# Patient Record
Sex: FEMALE | Race: WHITE | Employment: OTHER | URBAN - METROPOLITAN AREA
[De-identification: names, ages, dates, MRNs, and addresses within clinical notes are randomized per-mention and may not be internally consistent; named-entity substitution may affect disease eponyms.]

---

## 2017-01-23 ENCOUNTER — OFFICE VISIT (OUTPATIENT)
Dept: INTERNAL MEDICINE CLINIC | Age: 67
End: 2017-01-23

## 2017-01-23 VITALS
TEMPERATURE: 98.4 F | OXYGEN SATURATION: 97 % | RESPIRATION RATE: 16 BRPM | DIASTOLIC BLOOD PRESSURE: 64 MMHG | HEART RATE: 107 BPM | SYSTOLIC BLOOD PRESSURE: 112 MMHG

## 2017-01-23 DIAGNOSIS — J01.01 ACUTE RECURRENT MAXILLARY SINUSITIS: Primary | ICD-10-CM

## 2017-01-23 PROCEDURE — 1123F ACP DISCUSS/DSCN MKR DOCD: CPT | Performed by: INTERNAL MEDICINE

## 2017-01-23 PROCEDURE — 1090F PRES/ABSN URINE INCON ASSESS: CPT | Performed by: INTERNAL MEDICINE

## 2017-01-23 PROCEDURE — 3017F COLORECTAL CA SCREEN DOC REV: CPT | Performed by: INTERNAL MEDICINE

## 2017-01-23 PROCEDURE — G8427 DOCREV CUR MEDS BY ELIG CLIN: HCPCS | Performed by: INTERNAL MEDICINE

## 2017-01-23 PROCEDURE — G8419 CALC BMI OUT NRM PARAM NOF/U: HCPCS | Performed by: INTERNAL MEDICINE

## 2017-01-23 PROCEDURE — 99213 OFFICE O/P EST LOW 20 MIN: CPT | Performed by: INTERNAL MEDICINE

## 2017-01-23 PROCEDURE — G8484 FLU IMMUNIZE NO ADMIN: HCPCS | Performed by: INTERNAL MEDICINE

## 2017-01-23 PROCEDURE — 1036F TOBACCO NON-USER: CPT | Performed by: INTERNAL MEDICINE

## 2017-01-23 PROCEDURE — G8400 PT W/DXA NO RESULTS DOC: HCPCS | Performed by: INTERNAL MEDICINE

## 2017-01-23 PROCEDURE — 4040F PNEUMOC VAC/ADMIN/RCVD: CPT | Performed by: INTERNAL MEDICINE

## 2017-01-23 PROCEDURE — 3014F SCREEN MAMMO DOC REV: CPT | Performed by: INTERNAL MEDICINE

## 2017-01-23 RX ORDER — AMOXICILLIN 500 MG/1
500 CAPSULE ORAL 3 TIMES DAILY
Qty: 30 CAPSULE | Refills: 0 | Status: SHIPPED | OUTPATIENT
Start: 2017-01-23 | End: 2017-02-02

## 2017-02-06 DIAGNOSIS — M54.16 RADICULOPATHY, LUMBAR REGION: ICD-10-CM

## 2017-02-06 RX ORDER — TRAMADOL HYDROCHLORIDE 50 MG/1
50 TABLET ORAL EVERY 6 HOURS PRN
Qty: 60 TABLET | Refills: 0 | Status: SHIPPED | OUTPATIENT
Start: 2017-02-06 | End: 2017-04-17 | Stop reason: SDUPTHER

## 2017-03-06 RX ORDER — ATORVASTATIN CALCIUM 20 MG/1
TABLET, FILM COATED ORAL
Qty: 30 TABLET | Refills: 11 | Status: SHIPPED | OUTPATIENT
Start: 2017-03-06 | End: 2018-02-17 | Stop reason: SDUPTHER

## 2017-03-06 RX ORDER — POTASSIUM CHLORIDE 750 MG/1
TABLET, FILM COATED, EXTENDED RELEASE ORAL
Qty: 60 TABLET | Refills: 5 | Status: SHIPPED | OUTPATIENT
Start: 2017-03-06 | End: 2017-06-12 | Stop reason: SDUPTHER

## 2017-03-10 ENCOUNTER — OFFICE VISIT (OUTPATIENT)
Dept: INTERNAL MEDICINE CLINIC | Age: 67
End: 2017-03-10

## 2017-03-10 VITALS
BODY MASS INDEX: 45.54 KG/M2 | WEIGHT: 257 LBS | OXYGEN SATURATION: 99 % | DIASTOLIC BLOOD PRESSURE: 76 MMHG | RESPIRATION RATE: 18 BRPM | HEART RATE: 100 BPM | SYSTOLIC BLOOD PRESSURE: 118 MMHG | HEIGHT: 63 IN

## 2017-03-10 DIAGNOSIS — M54.16 RADICULOPATHY, LUMBAR REGION: ICD-10-CM

## 2017-03-10 DIAGNOSIS — C50.612 MALIGNANT NEOPLASM OF AXILLARY TAIL OF LEFT FEMALE BREAST (HCC): Primary | ICD-10-CM

## 2017-03-10 PROCEDURE — 3017F COLORECTAL CA SCREEN DOC REV: CPT | Performed by: INTERNAL MEDICINE

## 2017-03-10 PROCEDURE — G8417 CALC BMI ABV UP PARAM F/U: HCPCS | Performed by: INTERNAL MEDICINE

## 2017-03-10 PROCEDURE — G8400 PT W/DXA NO RESULTS DOC: HCPCS | Performed by: INTERNAL MEDICINE

## 2017-03-10 PROCEDURE — G8427 DOCREV CUR MEDS BY ELIG CLIN: HCPCS | Performed by: INTERNAL MEDICINE

## 2017-03-10 PROCEDURE — 3014F SCREEN MAMMO DOC REV: CPT | Performed by: INTERNAL MEDICINE

## 2017-03-10 PROCEDURE — 1036F TOBACCO NON-USER: CPT | Performed by: INTERNAL MEDICINE

## 2017-03-10 PROCEDURE — 1090F PRES/ABSN URINE INCON ASSESS: CPT | Performed by: INTERNAL MEDICINE

## 2017-03-10 PROCEDURE — 1123F ACP DISCUSS/DSCN MKR DOCD: CPT | Performed by: INTERNAL MEDICINE

## 2017-03-10 PROCEDURE — 99213 OFFICE O/P EST LOW 20 MIN: CPT | Performed by: INTERNAL MEDICINE

## 2017-03-10 PROCEDURE — G8484 FLU IMMUNIZE NO ADMIN: HCPCS | Performed by: INTERNAL MEDICINE

## 2017-03-10 PROCEDURE — 4040F PNEUMOC VAC/ADMIN/RCVD: CPT | Performed by: INTERNAL MEDICINE

## 2017-03-10 RX ORDER — RANITIDINE 150 MG/1
150 TABLET ORAL
COMMUNITY
End: 2017-09-12

## 2017-03-10 RX ORDER — LOSARTAN POTASSIUM 50 MG/1
50 TABLET ORAL DAILY
COMMUNITY
Start: 2017-02-28 | End: 2019-05-08 | Stop reason: SDUPTHER

## 2017-03-10 RX ORDER — FUROSEMIDE 20 MG/1
20 TABLET ORAL
COMMUNITY
Start: 2017-02-28 | End: 2019-09-25 | Stop reason: SDUPTHER

## 2017-03-22 RX ORDER — MOMETASONE FUROATE 50 UG/1
SPRAY, METERED NASAL
Qty: 1 INHALER | Refills: 11 | Status: SHIPPED | OUTPATIENT
Start: 2017-03-22 | End: 2020-01-07 | Stop reason: SDUPTHER

## 2017-04-12 RX ORDER — HYDROCHLOROTHIAZIDE 25 MG/1
25 TABLET ORAL DAILY
Qty: 30 TABLET | Refills: 3
Start: 2017-04-12 | End: 2017-08-02 | Stop reason: SDUPTHER

## 2017-04-17 DIAGNOSIS — M54.16 RADICULOPATHY, LUMBAR REGION: ICD-10-CM

## 2017-04-17 RX ORDER — TRAMADOL HYDROCHLORIDE 50 MG/1
50 TABLET ORAL EVERY 6 HOURS PRN
Qty: 60 TABLET | Refills: 0 | Status: SHIPPED | OUTPATIENT
Start: 2017-04-17 | End: 2017-06-12 | Stop reason: SDUPTHER

## 2017-06-12 ENCOUNTER — OFFICE VISIT (OUTPATIENT)
Dept: INTERNAL MEDICINE CLINIC | Age: 67
End: 2017-06-12

## 2017-06-12 VITALS
HEART RATE: 80 BPM | WEIGHT: 255 LBS | RESPIRATION RATE: 16 BRPM | BODY MASS INDEX: 45.17 KG/M2 | DIASTOLIC BLOOD PRESSURE: 80 MMHG | SYSTOLIC BLOOD PRESSURE: 120 MMHG

## 2017-06-12 DIAGNOSIS — C50.612 MALIGNANT NEOPLASM OF AXILLARY TAIL OF LEFT FEMALE BREAST (HCC): ICD-10-CM

## 2017-06-12 DIAGNOSIS — I10 ESSENTIAL HYPERTENSION: ICD-10-CM

## 2017-06-12 DIAGNOSIS — M54.16 RADICULOPATHY, LUMBAR REGION: ICD-10-CM

## 2017-06-12 DIAGNOSIS — E78.00 HYPERCHOLESTEROLEMIA: ICD-10-CM

## 2017-06-12 DIAGNOSIS — E78.00 HYPERCHOLESTEROLEMIA: Primary | ICD-10-CM

## 2017-06-12 LAB
A/G RATIO: 1.6 (ref 1.1–2.2)
ALBUMIN SERPL-MCNC: 4 G/DL (ref 3.4–5)
ALP BLD-CCNC: 92 U/L (ref 40–129)
ALT SERPL-CCNC: 40 U/L (ref 10–40)
ANION GAP SERPL CALCULATED.3IONS-SCNC: 14 MMOL/L (ref 3–16)
AST SERPL-CCNC: 40 U/L (ref 15–37)
BASOPHILS ABSOLUTE: 0.1 K/UL (ref 0–0.2)
BASOPHILS RELATIVE PERCENT: 1.3 %
BILIRUB SERPL-MCNC: 0.3 MG/DL (ref 0–1)
BUN BLDV-MCNC: 13 MG/DL (ref 7–20)
CALCIUM SERPL-MCNC: 9.1 MG/DL (ref 8.3–10.6)
CHLORIDE BLD-SCNC: 99 MMOL/L (ref 99–110)
CHOLESTEROL, TOTAL: 177 MG/DL (ref 0–199)
CO2: 28 MMOL/L (ref 21–32)
CREAT SERPL-MCNC: <0.5 MG/DL (ref 0.6–1.2)
EOSINOPHILS ABSOLUTE: 0.1 K/UL (ref 0–0.6)
EOSINOPHILS RELATIVE PERCENT: 3.2 %
GFR AFRICAN AMERICAN: >60
GFR NON-AFRICAN AMERICAN: >60
GLOBULIN: 2.5 G/DL
GLUCOSE BLD-MCNC: 113 MG/DL (ref 70–99)
HCT VFR BLD CALC: 33.7 % (ref 36–48)
HDLC SERPL-MCNC: 51 MG/DL (ref 40–60)
HEMOGLOBIN: 10.3 G/DL (ref 12–16)
LDL CHOLESTEROL CALCULATED: 99 MG/DL
LYMPHOCYTES ABSOLUTE: 0.9 K/UL (ref 1–5.1)
LYMPHOCYTES RELATIVE PERCENT: 21.8 %
MCH RBC QN AUTO: 25 PG (ref 26–34)
MCHC RBC AUTO-ENTMCNC: 30.4 G/DL (ref 31–36)
MCV RBC AUTO: 82.1 FL (ref 80–100)
MONOCYTES ABSOLUTE: 0.6 K/UL (ref 0–1.3)
MONOCYTES RELATIVE PERCENT: 13 %
NEUTROPHILS ABSOLUTE: 2.6 K/UL (ref 1.7–7.7)
NEUTROPHILS RELATIVE PERCENT: 60.7 %
PDW BLD-RTO: 24.1 % (ref 12.4–15.4)
PLATELET # BLD: 346 K/UL (ref 135–450)
PMV BLD AUTO: 7.9 FL (ref 5–10.5)
POTASSIUM SERPL-SCNC: 3.9 MMOL/L (ref 3.5–5.1)
RBC # BLD: 4.1 M/UL (ref 4–5.2)
SODIUM BLD-SCNC: 141 MMOL/L (ref 136–145)
TOTAL PROTEIN: 6.5 G/DL (ref 6.4–8.2)
TRIGL SERPL-MCNC: 135 MG/DL (ref 0–150)
VLDLC SERPL CALC-MCNC: 27 MG/DL
WBC # BLD: 4.3 K/UL (ref 4–11)

## 2017-06-12 PROCEDURE — 99214 OFFICE O/P EST MOD 30 MIN: CPT | Performed by: INTERNAL MEDICINE

## 2017-06-12 RX ORDER — POTASSIUM CHLORIDE 1500 MG/1
20 TABLET, FILM COATED, EXTENDED RELEASE ORAL 2 TIMES DAILY
Qty: 60 TABLET | Refills: 11
Start: 2017-06-12 | End: 2018-01-23 | Stop reason: SDUPTHER

## 2017-06-12 RX ORDER — TRAMADOL HYDROCHLORIDE 50 MG/1
50 TABLET ORAL EVERY 6 HOURS PRN
Qty: 60 TABLET | Refills: 0 | Status: SHIPPED | OUTPATIENT
Start: 2017-06-12 | End: 2017-08-21 | Stop reason: SDUPTHER

## 2017-07-31 ENCOUNTER — PATIENT MESSAGE (OUTPATIENT)
Dept: INTERNAL MEDICINE CLINIC | Age: 67
End: 2017-07-31

## 2017-08-01 DIAGNOSIS — M54.30 SCIATICA WITHOUT BACK PAIN, UNSPECIFIED LATERALITY: Primary | ICD-10-CM

## 2017-08-01 DIAGNOSIS — R53.1 LACK OF STRENGTH: ICD-10-CM

## 2017-08-02 RX ORDER — HYDROCHLOROTHIAZIDE 25 MG/1
25 TABLET ORAL DAILY
Qty: 30 TABLET | Refills: 11 | Status: SHIPPED | OUTPATIENT
Start: 2017-08-02 | End: 2018-07-15 | Stop reason: SDUPTHER

## 2017-08-04 DIAGNOSIS — M54.30 SCIATICA, UNSPECIFIED LATERALITY: Primary | ICD-10-CM

## 2017-08-21 DIAGNOSIS — M54.16 RADICULOPATHY, LUMBAR REGION: ICD-10-CM

## 2017-08-21 RX ORDER — CELECOXIB 100 MG/1
CAPSULE ORAL
Qty: 60 CAPSULE | Refills: 10 | Status: SHIPPED | OUTPATIENT
Start: 2017-08-21 | End: 2018-07-11 | Stop reason: SDUPTHER

## 2017-08-21 RX ORDER — TRAMADOL HYDROCHLORIDE 50 MG/1
50 TABLET ORAL EVERY 6 HOURS PRN
Qty: 60 TABLET | Refills: 0 | Status: SHIPPED | OUTPATIENT
Start: 2017-08-21 | End: 2017-10-18 | Stop reason: SDUPTHER

## 2017-09-12 ENCOUNTER — OFFICE VISIT (OUTPATIENT)
Dept: INTERNAL MEDICINE CLINIC | Age: 67
End: 2017-09-12

## 2017-09-12 VITALS
SYSTOLIC BLOOD PRESSURE: 130 MMHG | WEIGHT: 257 LBS | HEIGHT: 63 IN | BODY MASS INDEX: 45.54 KG/M2 | HEART RATE: 80 BPM | DIASTOLIC BLOOD PRESSURE: 70 MMHG | RESPIRATION RATE: 18 BRPM

## 2017-09-12 DIAGNOSIS — Z23 NEED FOR PNEUMOCOCCAL VACCINATION: ICD-10-CM

## 2017-09-12 DIAGNOSIS — M15.9 OSTEOARTHRITIS, GENERALIZED: ICD-10-CM

## 2017-09-12 DIAGNOSIS — I10 ESSENTIAL HYPERTENSION: Primary | ICD-10-CM

## 2017-09-12 PROCEDURE — 90732 PPSV23 VACC 2 YRS+ SUBQ/IM: CPT | Performed by: INTERNAL MEDICINE

## 2017-09-12 PROCEDURE — 90471 IMMUNIZATION ADMIN: CPT | Performed by: INTERNAL MEDICINE

## 2017-09-12 PROCEDURE — 90662 IIV NO PRSV INCREASED AG IM: CPT | Performed by: INTERNAL MEDICINE

## 2017-09-12 PROCEDURE — 90472 IMMUNIZATION ADMIN EACH ADD: CPT | Performed by: INTERNAL MEDICINE

## 2017-09-12 PROCEDURE — 99213 OFFICE O/P EST LOW 20 MIN: CPT | Performed by: INTERNAL MEDICINE

## 2017-09-12 RX ORDER — ANASTROZOLE 1 MG/1
1 TABLET ORAL DAILY
Qty: 30 TABLET | Refills: 3
Start: 2017-09-12 | End: 2018-09-26 | Stop reason: SDUPTHER

## 2017-09-13 DIAGNOSIS — M51.36 DEGENERATIVE DISC DISEASE, LUMBAR: ICD-10-CM

## 2017-09-13 RX ORDER — DULOXETIN HYDROCHLORIDE 60 MG/1
CAPSULE, DELAYED RELEASE ORAL
Qty: 30 CAPSULE | Refills: 11 | Status: SHIPPED | OUTPATIENT
Start: 2017-09-13 | End: 2018-09-07 | Stop reason: SDUPTHER

## 2017-10-18 ENCOUNTER — TELEPHONE (OUTPATIENT)
Dept: INTERNAL MEDICINE CLINIC | Age: 67
End: 2017-10-18

## 2017-10-18 DIAGNOSIS — M54.16 RADICULOPATHY, LUMBAR REGION: ICD-10-CM

## 2017-10-18 DIAGNOSIS — M51.36 DEGENERATIVE DISC DISEASE, LUMBAR: ICD-10-CM

## 2017-10-18 RX ORDER — TRAMADOL HYDROCHLORIDE 50 MG/1
50 TABLET ORAL EVERY 6 HOURS PRN
Qty: 60 TABLET | Refills: 0 | Status: SHIPPED | OUTPATIENT
Start: 2017-10-18 | End: 2017-12-26 | Stop reason: SDUPTHER

## 2017-12-04 ENCOUNTER — OFFICE VISIT (OUTPATIENT)
Dept: INTERNAL MEDICINE CLINIC | Age: 67
End: 2017-12-04

## 2017-12-04 VITALS
HEART RATE: 104 BPM | BODY MASS INDEX: 45.03 KG/M2 | OXYGEN SATURATION: 94 % | DIASTOLIC BLOOD PRESSURE: 76 MMHG | WEIGHT: 254.2 LBS | RESPIRATION RATE: 18 BRPM | SYSTOLIC BLOOD PRESSURE: 132 MMHG

## 2017-12-04 DIAGNOSIS — R06.2 WHEEZES: ICD-10-CM

## 2017-12-04 DIAGNOSIS — J40 BRONCHITIS: ICD-10-CM

## 2017-12-04 PROCEDURE — 99213 OFFICE O/P EST LOW 20 MIN: CPT | Performed by: INTERNAL MEDICINE

## 2017-12-04 RX ORDER — ALBUTEROL SULFATE 1.25 MG/3ML
1 SOLUTION RESPIRATORY (INHALATION) EVERY 6 HOURS PRN
Qty: 30 VIAL | Refills: 11 | Status: SHIPPED | OUTPATIENT
Start: 2017-12-04 | End: 2020-06-17 | Stop reason: ALTCHOICE

## 2017-12-04 RX ORDER — PREDNISONE 10 MG/1
TABLET ORAL
Qty: 20 TABLET | Refills: 0 | Status: SHIPPED | OUTPATIENT
Start: 2017-12-04 | End: 2017-12-12

## 2017-12-04 NOTE — PROGRESS NOTES
Patricia Lank  1950 12/04/17    SUBJECTIVE:    Pt complains of cough productive of clear/yellow mucous, wheezing, rhinorrhea and nasal congestion that have resolved. She denies F/C, ear pain, sinus pain. Symptoms have been present for 3 weeks. She has used nasonex with no benefit. OBJECTIVE:    /76   Pulse 104   Resp 18   Wt 254 lb 3.2 oz (115.3 kg)   LMP  (LMP Unknown)   SpO2 94%   Breastfeeding? No   BMI 45.03 kg/m²     Physical Exam   Constitutional: She appears well-developed. HENT:   Right Ear: External ear normal.   Left Ear: External ear normal.   Nose: Nose normal.   Mouth/Throat: No oropharyngeal exudate. Eyes: Conjunctivae are normal.   Cardiovascular: Normal rate, regular rhythm and normal heart sounds. Pulmonary/Chest: Effort normal. She has wheezes. She has rhonchi. She has no rales. Lymphadenopathy:     She has no cervical adenopathy. Neurological: She is alert. ASSESSMENT:    1. Bronchitis    2. Wheezes        PLAN:    Brandi Brian was seen today for cough and shortness of breath. Diagnoses and all orders for this visit:    Bronchitis - likely viral, no ishaan for abx; will Tx with prednisone and albuterol  -     albuterol (ACCUNEB) 1.25 MG/3ML nebulizer solution; Inhale 3 mLs into the lungs every 6 hours as needed for Wheezing  -     predniSONE (DELTASONE) 10 MG tablet; Take 4 tablets daily for 2 days, then 3 tablets daily for 2 days, then two tablets daily for 2 days, then one tablet daily for 2 days    Wheezes  -     albuterol (ACCUNEB) 1.25 MG/3ML nebulizer solution;  Inhale 3 mLs into the lungs every 6 hours as needed for Wheezing

## 2017-12-12 ENCOUNTER — OFFICE VISIT (OUTPATIENT)
Dept: INTERNAL MEDICINE CLINIC | Age: 67
End: 2017-12-12

## 2017-12-12 VITALS
SYSTOLIC BLOOD PRESSURE: 124 MMHG | HEART RATE: 84 BPM | WEIGHT: 253 LBS | RESPIRATION RATE: 16 BRPM | BODY MASS INDEX: 44.82 KG/M2 | DIASTOLIC BLOOD PRESSURE: 72 MMHG

## 2017-12-12 DIAGNOSIS — M15.9 GENERALIZED OSTEOARTHRITIS: ICD-10-CM

## 2017-12-12 DIAGNOSIS — I10 ESSENTIAL HYPERTENSION: ICD-10-CM

## 2017-12-12 DIAGNOSIS — E78.00 HYPERCHOLESTEROLEMIA: Primary | ICD-10-CM

## 2017-12-12 DIAGNOSIS — E55.9 VITAMIN D DEFICIENCY: ICD-10-CM

## 2017-12-12 DIAGNOSIS — M54.16 RADICULOPATHY, LUMBAR REGION: ICD-10-CM

## 2017-12-12 PROCEDURE — 99214 OFFICE O/P EST MOD 30 MIN: CPT | Performed by: INTERNAL MEDICINE

## 2017-12-26 DIAGNOSIS — M54.16 RADICULOPATHY, LUMBAR REGION: ICD-10-CM

## 2017-12-26 RX ORDER — TRAMADOL HYDROCHLORIDE 50 MG/1
50 TABLET ORAL EVERY 6 HOURS PRN
Qty: 60 TABLET | Refills: 0 | Status: SHIPPED | OUTPATIENT
Start: 2017-12-26 | End: 2018-02-27 | Stop reason: SDUPTHER

## 2018-01-05 ENCOUNTER — OFFICE VISIT (OUTPATIENT)
Dept: INTERNAL MEDICINE CLINIC | Age: 68
End: 2018-01-05

## 2018-01-05 VITALS
DIASTOLIC BLOOD PRESSURE: 76 MMHG | RESPIRATION RATE: 16 BRPM | HEART RATE: 84 BPM | SYSTOLIC BLOOD PRESSURE: 130 MMHG | TEMPERATURE: 98.3 F

## 2018-01-05 DIAGNOSIS — J01.00 ACUTE NON-RECURRENT MAXILLARY SINUSITIS: Primary | ICD-10-CM

## 2018-01-05 PROCEDURE — 99213 OFFICE O/P EST LOW 20 MIN: CPT | Performed by: INTERNAL MEDICINE

## 2018-01-05 RX ORDER — AMOXICILLIN 500 MG/1
500 CAPSULE ORAL 3 TIMES DAILY
Qty: 30 CAPSULE | Refills: 0 | Status: SHIPPED | OUTPATIENT
Start: 2018-01-05 | End: 2018-01-15

## 2018-01-05 RX ORDER — PREDNISONE 10 MG/1
TABLET ORAL
Qty: 20 TABLET | Refills: 0 | Status: SHIPPED | OUTPATIENT
Start: 2018-01-05 | End: 2018-01-15

## 2018-01-23 RX ORDER — POTASSIUM CHLORIDE 1500 MG/1
20 TABLET, FILM COATED, EXTENDED RELEASE ORAL 2 TIMES DAILY
Qty: 60 TABLET | Refills: 11 | Status: SHIPPED | OUTPATIENT
Start: 2018-01-23 | End: 2019-01-10 | Stop reason: SDUPTHER

## 2018-02-19 RX ORDER — ATORVASTATIN CALCIUM 20 MG/1
TABLET, FILM COATED ORAL
Qty: 30 TABLET | Refills: 11 | Status: SHIPPED | OUTPATIENT
Start: 2018-02-19 | End: 2018-03-21 | Stop reason: SDUPTHER

## 2018-02-27 DIAGNOSIS — M54.16 RADICULOPATHY, LUMBAR REGION: ICD-10-CM

## 2018-02-27 RX ORDER — TRAMADOL HYDROCHLORIDE 50 MG/1
50 TABLET ORAL EVERY 6 HOURS PRN
Qty: 60 TABLET | Refills: 0 | Status: SHIPPED | OUTPATIENT
Start: 2018-02-27 | End: 2018-04-30 | Stop reason: SDUPTHER

## 2018-03-14 ENCOUNTER — OFFICE VISIT (OUTPATIENT)
Dept: INTERNAL MEDICINE CLINIC | Age: 68
End: 2018-03-14

## 2018-03-14 VITALS
SYSTOLIC BLOOD PRESSURE: 118 MMHG | BODY MASS INDEX: 43.51 KG/M2 | HEART RATE: 92 BPM | OXYGEN SATURATION: 96 % | DIASTOLIC BLOOD PRESSURE: 70 MMHG | RESPIRATION RATE: 18 BRPM | WEIGHT: 245.6 LBS

## 2018-03-14 DIAGNOSIS — I10 ESSENTIAL HYPERTENSION: ICD-10-CM

## 2018-03-14 DIAGNOSIS — R73.01 IMPAIRED FASTING GLUCOSE: ICD-10-CM

## 2018-03-14 DIAGNOSIS — E78.00 HYPERCHOLESTEROLEMIA: ICD-10-CM

## 2018-03-14 DIAGNOSIS — C50.612 MALIGNANT NEOPLASM OF AXILLARY TAIL OF LEFT BREAST IN FEMALE, ESTROGEN RECEPTOR POSITIVE (HCC): ICD-10-CM

## 2018-03-14 DIAGNOSIS — E55.9 VITAMIN D DEFICIENCY: ICD-10-CM

## 2018-03-14 DIAGNOSIS — Z17.0 MALIGNANT NEOPLASM OF AXILLARY TAIL OF LEFT BREAST IN FEMALE, ESTROGEN RECEPTOR POSITIVE (HCC): ICD-10-CM

## 2018-03-14 DIAGNOSIS — Z78.0 MENOPAUSE: ICD-10-CM

## 2018-03-14 DIAGNOSIS — M54.12 CERVICAL RADICULOPATHY: Primary | ICD-10-CM

## 2018-03-14 LAB
A/G RATIO: 1.4 (ref 1.1–2.2)
ALBUMIN SERPL-MCNC: 4.1 G/DL (ref 3.4–5)
ALP BLD-CCNC: 100 U/L (ref 40–129)
ALT SERPL-CCNC: 20 U/L (ref 10–40)
ANION GAP SERPL CALCULATED.3IONS-SCNC: 16 MMOL/L (ref 3–16)
AST SERPL-CCNC: 23 U/L (ref 15–37)
BASOPHILS ABSOLUTE: 0.1 K/UL (ref 0–0.2)
BASOPHILS RELATIVE PERCENT: 1.4 %
BILIRUB SERPL-MCNC: 0.3 MG/DL (ref 0–1)
BUN BLDV-MCNC: 9 MG/DL (ref 7–20)
CALCIUM SERPL-MCNC: 9.1 MG/DL (ref 8.3–10.6)
CHLORIDE BLD-SCNC: 99 MMOL/L (ref 99–110)
CHOLESTEROL, TOTAL: 178 MG/DL (ref 0–199)
CO2: 29 MMOL/L (ref 21–32)
CREAT SERPL-MCNC: <0.5 MG/DL (ref 0.6–1.2)
EOSINOPHILS ABSOLUTE: 0.1 K/UL (ref 0–0.6)
EOSINOPHILS RELATIVE PERCENT: 2.6 %
GFR AFRICAN AMERICAN: >60
GFR NON-AFRICAN AMERICAN: >60
GLOBULIN: 3 G/DL
GLUCOSE BLD-MCNC: 104 MG/DL (ref 70–99)
HCT VFR BLD CALC: 38.1 % (ref 36–48)
HDLC SERPL-MCNC: 54 MG/DL (ref 40–60)
HEMOGLOBIN: 12.4 G/DL (ref 12–16)
LDL CHOLESTEROL CALCULATED: 87 MG/DL
LYMPHOCYTES ABSOLUTE: 1.2 K/UL (ref 1–5.1)
LYMPHOCYTES RELATIVE PERCENT: 24.3 %
MCH RBC QN AUTO: 28.2 PG (ref 26–34)
MCHC RBC AUTO-ENTMCNC: 32.6 G/DL (ref 31–36)
MCV RBC AUTO: 86.4 FL (ref 80–100)
MONOCYTES ABSOLUTE: 0.6 K/UL (ref 0–1.3)
MONOCYTES RELATIVE PERCENT: 11 %
NEUTROPHILS ABSOLUTE: 3.1 K/UL (ref 1.7–7.7)
NEUTROPHILS RELATIVE PERCENT: 60.7 %
PDW BLD-RTO: 19.9 % (ref 12.4–15.4)
PLATELET # BLD: 346 K/UL (ref 135–450)
PMV BLD AUTO: 9.1 FL (ref 5–10.5)
POTASSIUM SERPL-SCNC: 4.1 MMOL/L (ref 3.5–5.1)
RBC # BLD: 4.41 M/UL (ref 4–5.2)
SODIUM BLD-SCNC: 144 MMOL/L (ref 136–145)
TOTAL PROTEIN: 7.1 G/DL (ref 6.4–8.2)
TRIGL SERPL-MCNC: 187 MG/DL (ref 0–150)
VITAMIN D 25-HYDROXY: 40.1 NG/ML
VLDLC SERPL CALC-MCNC: 37 MG/DL
WBC # BLD: 5.1 K/UL (ref 4–11)

## 2018-03-14 PROCEDURE — 99214 OFFICE O/P EST MOD 30 MIN: CPT | Performed by: INTERNAL MEDICINE

## 2018-03-14 ASSESSMENT — PATIENT HEALTH QUESTIONNAIRE - PHQ9
SUM OF ALL RESPONSES TO PHQ9 QUESTIONS 1 & 2: 0
SUM OF ALL RESPONSES TO PHQ QUESTIONS 1-9: 0
2. FEELING DOWN, DEPRESSED OR HOPELESS: 0
1. LITTLE INTEREST OR PLEASURE IN DOING THINGS: 0

## 2018-03-14 NOTE — LETTER
[facility]  [address]  Phone: [phone]  Fax: [phone]    Awilda Fernando MD        March 14, 2018     Patient: Gaviota Palencia   YOB: 1950   Date of Visit: 3/14/2018       To Whom it May Concern:    Gaviota Palencia was seen in my clinic on 3/14/2018. She may return to work today. If you have any questions or concerns, please don't hesitate to call.     Sincerely,         Awilda Fernando MD

## 2018-03-15 LAB
ESTIMATED AVERAGE GLUCOSE: 151.3 MG/DL
HBA1C MFR BLD: 6.9 %

## 2018-03-21 ENCOUNTER — OFFICE VISIT (OUTPATIENT)
Dept: INTERNAL MEDICINE CLINIC | Age: 68
End: 2018-03-21

## 2018-03-21 VITALS
SYSTOLIC BLOOD PRESSURE: 122 MMHG | HEART RATE: 96 BPM | DIASTOLIC BLOOD PRESSURE: 76 MMHG | RESPIRATION RATE: 16 BRPM | OXYGEN SATURATION: 96 %

## 2018-03-21 DIAGNOSIS — E11.9 CONTROLLED TYPE 2 DIABETES MELLITUS WITHOUT COMPLICATION, WITHOUT LONG-TERM CURRENT USE OF INSULIN (HCC): Primary | ICD-10-CM

## 2018-03-21 PROCEDURE — 99213 OFFICE O/P EST LOW 20 MIN: CPT | Performed by: INTERNAL MEDICINE

## 2018-03-21 RX ORDER — ATORVASTATIN CALCIUM 40 MG/1
TABLET, FILM COATED ORAL
Qty: 30 TABLET | Refills: 11 | Status: SHIPPED | OUTPATIENT
Start: 2018-03-21 | End: 2019-03-05 | Stop reason: SDUPTHER

## 2018-03-21 RX ORDER — ASPIRIN 81 MG/1
81 TABLET, CHEWABLE ORAL DAILY
Qty: 30 TABLET | Refills: 3 | Status: ON HOLD | COMMUNITY
Start: 2018-03-21 | End: 2020-06-16 | Stop reason: HOSPADM

## 2018-03-27 ENCOUNTER — HOSPITAL ENCOUNTER (OUTPATIENT)
Dept: MRI IMAGING | Age: 68
Discharge: HOME | End: 2018-03-27
Attending: INTERNAL MEDICINE | Admitting: INTERNAL MEDICINE

## 2018-03-27 DIAGNOSIS — M54.12 CERVICAL RADICULOPATHY: ICD-10-CM

## 2018-03-27 DIAGNOSIS — M54.12 RADICULOPATHY OF CERVICAL REGION: ICD-10-CM

## 2018-03-28 DIAGNOSIS — M48.00 SPINAL STENOSIS, UNSPECIFIED SPINAL REGION: Primary | ICD-10-CM

## 2018-04-30 DIAGNOSIS — M54.16 RADICULOPATHY, LUMBAR REGION: ICD-10-CM

## 2018-04-30 RX ORDER — TRAMADOL HYDROCHLORIDE 50 MG/1
50 TABLET ORAL EVERY 6 HOURS PRN
Qty: 60 TABLET | Refills: 0 | Status: SHIPPED | OUTPATIENT
Start: 2018-04-30 | End: 2018-06-27 | Stop reason: SDUPTHER

## 2018-06-27 ENCOUNTER — OFFICE VISIT (OUTPATIENT)
Dept: INTERNAL MEDICINE CLINIC | Age: 68
End: 2018-06-27

## 2018-06-27 VITALS
DIASTOLIC BLOOD PRESSURE: 72 MMHG | BODY MASS INDEX: 45.35 KG/M2 | OXYGEN SATURATION: 94 % | SYSTOLIC BLOOD PRESSURE: 118 MMHG | WEIGHT: 256 LBS | HEART RATE: 101 BPM | RESPIRATION RATE: 18 BRPM

## 2018-06-27 DIAGNOSIS — E11.9 CONTROLLED TYPE 2 DIABETES MELLITUS WITHOUT COMPLICATION, WITHOUT LONG-TERM CURRENT USE OF INSULIN (HCC): ICD-10-CM

## 2018-06-27 DIAGNOSIS — M54.16 RADICULOPATHY, LUMBAR REGION: Primary | ICD-10-CM

## 2018-06-27 DIAGNOSIS — Z91.81 AT HIGH RISK FOR FALLS: ICD-10-CM

## 2018-06-27 PROCEDURE — 99213 OFFICE O/P EST LOW 20 MIN: CPT | Performed by: INTERNAL MEDICINE

## 2018-06-27 RX ORDER — GABAPENTIN 100 MG/1
100 CAPSULE ORAL NIGHTLY
Qty: 90 CAPSULE | Refills: 3 | Status: SHIPPED | OUTPATIENT
Start: 2018-06-27 | End: 2019-07-17 | Stop reason: SDUPTHER

## 2018-06-27 RX ORDER — TRAMADOL HYDROCHLORIDE 50 MG/1
50 TABLET ORAL EVERY 6 HOURS PRN
Qty: 60 TABLET | Refills: 0 | Status: SHIPPED | OUTPATIENT
Start: 2018-06-27 | End: 2018-09-05 | Stop reason: SDUPTHER

## 2018-07-11 RX ORDER — CELECOXIB 100 MG/1
CAPSULE ORAL
Qty: 60 CAPSULE | Refills: 11 | Status: ON HOLD | OUTPATIENT
Start: 2018-07-11 | End: 2018-10-22

## 2018-07-16 RX ORDER — HYDROCHLOROTHIAZIDE 25 MG/1
TABLET ORAL
Qty: 30 TABLET | Refills: 10 | Status: SHIPPED | OUTPATIENT
Start: 2018-07-16 | End: 2019-05-07 | Stop reason: SDUPTHER

## 2018-07-17 ENCOUNTER — HOSPITAL ENCOUNTER (OUTPATIENT)
Dept: DIABETES SERVICES | Age: 68
Discharge: HOME | End: 2018-07-31
Attending: INTERNAL MEDICINE | Admitting: INTERNAL MEDICINE

## 2018-07-17 RX ORDER — MULTIVIT WITH MINERALS/LUTEIN
1000 TABLET ORAL DAILY
COMMUNITY
End: 2020-06-17 | Stop reason: ALTCHOICE

## 2018-07-17 RX ORDER — MAGNESIUM 30 MG
50 TABLET ORAL 2 TIMES DAILY
COMMUNITY
End: 2020-06-17 | Stop reason: ALTCHOICE

## 2018-07-17 ASSESSMENT — PROBLEM AREAS IN DIABETES QUESTIONNAIRE (PAID)
WORRYING ABOUT THE FUTURE AND THE POSSIBILITY OF SERIOUS COMPLICATIONS: 2
FEELING THAT DIABETES IS TAKING UP TOO MUCH OF YOUR MENTAL AND PHYSICAL ENERGY EVERY DAY: 1
COPING WITH COMPLICATIONS OF DIABETES: 1
FEELING DEPRESSED WHEN YOU THINK ABOUT LIVING WITH DIABETES: 1

## 2018-08-01 ENCOUNTER — HOSPITAL ENCOUNTER (OUTPATIENT)
Dept: OTHER | Age: 68
Discharge: HOME | End: 2018-08-31
Attending: INTERNAL MEDICINE | Admitting: INTERNAL MEDICINE

## 2018-09-05 DIAGNOSIS — M54.16 RADICULOPATHY, LUMBAR REGION: ICD-10-CM

## 2018-09-05 RX ORDER — TRAMADOL HYDROCHLORIDE 50 MG/1
50 TABLET ORAL EVERY 6 HOURS PRN
Qty: 60 TABLET | Refills: 0 | Status: SHIPPED | OUTPATIENT
Start: 2018-09-05 | End: 2018-11-12 | Stop reason: SDUPTHER

## 2018-09-07 DIAGNOSIS — M51.36 DEGENERATIVE DISC DISEASE, LUMBAR: ICD-10-CM

## 2018-09-07 RX ORDER — DULOXETIN HYDROCHLORIDE 60 MG/1
CAPSULE, DELAYED RELEASE ORAL
Qty: 30 CAPSULE | Refills: 11 | Status: SHIPPED | OUTPATIENT
Start: 2018-09-07 | End: 2019-06-25

## 2018-09-26 ENCOUNTER — OFFICE VISIT (OUTPATIENT)
Dept: INTERNAL MEDICINE CLINIC | Age: 68
End: 2018-09-26
Payer: COMMERCIAL

## 2018-09-26 VITALS
HEART RATE: 84 BPM | WEIGHT: 257.4 LBS | SYSTOLIC BLOOD PRESSURE: 116 MMHG | OXYGEN SATURATION: 95 % | DIASTOLIC BLOOD PRESSURE: 74 MMHG | RESPIRATION RATE: 18 BRPM | BODY MASS INDEX: 45.6 KG/M2

## 2018-09-26 DIAGNOSIS — E78.00 HYPERCHOLESTEROLEMIA: ICD-10-CM

## 2018-09-26 DIAGNOSIS — I10 ESSENTIAL HYPERTENSION: ICD-10-CM

## 2018-09-26 DIAGNOSIS — M54.16 RADICULOPATHY, LUMBAR REGION: ICD-10-CM

## 2018-09-26 DIAGNOSIS — Z23 FLU VACCINE NEED: ICD-10-CM

## 2018-09-26 DIAGNOSIS — E11.9 CONTROLLED TYPE 2 DIABETES MELLITUS WITHOUT COMPLICATION, WITHOUT LONG-TERM CURRENT USE OF INSULIN (HCC): Primary | ICD-10-CM

## 2018-09-26 DIAGNOSIS — M15.9 GENERALIZED OSTEOARTHRITIS: ICD-10-CM

## 2018-09-26 DIAGNOSIS — E11.9 CONTROLLED TYPE 2 DIABETES MELLITUS WITHOUT COMPLICATION, WITHOUT LONG-TERM CURRENT USE OF INSULIN (HCC): ICD-10-CM

## 2018-09-26 LAB
A/G RATIO: 1.5 (ref 1.1–2.2)
ALBUMIN SERPL-MCNC: 4.1 G/DL (ref 3.4–5)
ALP BLD-CCNC: 101 U/L (ref 40–129)
ALT SERPL-CCNC: 14 U/L (ref 10–40)
ANION GAP SERPL CALCULATED.3IONS-SCNC: 14 MMOL/L (ref 3–16)
AST SERPL-CCNC: 19 U/L (ref 15–37)
BASOPHILS ABSOLUTE: 0.1 K/UL (ref 0–0.2)
BASOPHILS RELATIVE PERCENT: 1 %
BILIRUB SERPL-MCNC: 0.4 MG/DL (ref 0–1)
BUN BLDV-MCNC: 11 MG/DL (ref 7–20)
CALCIUM SERPL-MCNC: 9.5 MG/DL (ref 8.3–10.6)
CHLORIDE BLD-SCNC: 101 MMOL/L (ref 99–110)
CHOLESTEROL, TOTAL: 177 MG/DL (ref 0–199)
CO2: 26 MMOL/L (ref 21–32)
CREAT SERPL-MCNC: <0.5 MG/DL (ref 0.6–1.2)
CREATININE URINE: 52.5 MG/DL (ref 28–259)
EOSINOPHILS ABSOLUTE: 0.2 K/UL (ref 0–0.6)
EOSINOPHILS RELATIVE PERCENT: 4.8 %
GFR AFRICAN AMERICAN: >60
GFR NON-AFRICAN AMERICAN: >60
GLOBULIN: 2.7 G/DL
GLUCOSE BLD-MCNC: 110 MG/DL (ref 70–99)
HCT VFR BLD CALC: 31.5 % (ref 36–48)
HDLC SERPL-MCNC: 57 MG/DL (ref 40–60)
HEMOGLOBIN: 9.8 G/DL (ref 12–16)
LDL CHOLESTEROL CALCULATED: 94 MG/DL
LYMPHOCYTES ABSOLUTE: 1.2 K/UL (ref 1–5.1)
LYMPHOCYTES RELATIVE PERCENT: 23.6 %
MCH RBC QN AUTO: 24.4 PG (ref 26–34)
MCHC RBC AUTO-ENTMCNC: 31 G/DL (ref 31–36)
MCV RBC AUTO: 78.8 FL (ref 80–100)
MICROALBUMIN UR-MCNC: <1.2 MG/DL
MICROALBUMIN/CREAT UR-RTO: NORMAL MG/G (ref 0–30)
MONOCYTES ABSOLUTE: 0.5 K/UL (ref 0–1.3)
MONOCYTES RELATIVE PERCENT: 9.2 %
NEUTROPHILS ABSOLUTE: 3 K/UL (ref 1.7–7.7)
NEUTROPHILS RELATIVE PERCENT: 61.4 %
PDW BLD-RTO: 19.8 % (ref 12.4–15.4)
PLATELET # BLD: 424 K/UL (ref 135–450)
PMV BLD AUTO: 8.3 FL (ref 5–10.5)
POTASSIUM SERPL-SCNC: 4.4 MMOL/L (ref 3.5–5.1)
RBC # BLD: 3.99 M/UL (ref 4–5.2)
SODIUM BLD-SCNC: 141 MMOL/L (ref 136–145)
TOTAL PROTEIN: 6.8 G/DL (ref 6.4–8.2)
TRIGL SERPL-MCNC: 131 MG/DL (ref 0–150)
VLDLC SERPL CALC-MCNC: 26 MG/DL
WBC # BLD: 4.9 K/UL (ref 4–11)

## 2018-09-26 PROCEDURE — 90662 IIV NO PRSV INCREASED AG IM: CPT | Performed by: INTERNAL MEDICINE

## 2018-09-26 PROCEDURE — 99214 OFFICE O/P EST MOD 30 MIN: CPT | Performed by: INTERNAL MEDICINE

## 2018-09-26 PROCEDURE — 90471 IMMUNIZATION ADMIN: CPT | Performed by: INTERNAL MEDICINE

## 2018-09-26 RX ORDER — ANASTROZOLE 1 MG/1
1 TABLET ORAL DAILY
Qty: 90 TABLET | Refills: 3 | Status: SHIPPED | OUTPATIENT
Start: 2018-09-26 | End: 2019-09-25 | Stop reason: SDUPTHER

## 2018-09-26 NOTE — LETTER
IMPRESSION:  1. BMD is characterized as normal.   2. The current measurement overestimates BMD at the spine, likely due to  presence of degenerative changes. * Osteoporosis is defined as a skeletal disorder characterized by compromised  bone strength predisposing to an increased risk of fracture. Bone strength  reflects the integration of two main features: BMD and bone quality (DENNIS  2001;285:785-795). Any history of fragility fracture is suggestive of  osteoporosis, regardless of the BMD data acquired in this study. * The T-score reflects standard deviations above (+) or below (-) a [address]  year-old, , female, [address] [facility] [facility] [address] population. At this age, it is assumed that  peak bone mass is reached. * The WHO densitometric classification is applicable to postmenopausal women  and men age 48 and older: a T-score >/= -1.0 is normal, < -1.0 and > -2.5 is  osteopenia, and =/< -2.5 is osteoporosis. * The Z-score reflects standard deviations above (+) or below (-) an age, sex,  ethnicity, and weight-matched population. Electronically Signed By: Maurilio Quintero MD on 7/1/2018 10:20 AM   Result Narrative   DUAL ENERGY X-RAY ABSORPTIOMETRY (DEXA) REPORT    EXAM:  DEXA scanning was performed at the [address] [place] [address] using a [employer] Discovery A bone densitometer on 06/22/2018 09:37 AM.    CLINICAL INDICATIONS:  see comments, C50.812:Malignant neoplasm of overlapping sites of left breast  in female, estrogen receptor positive  Z17. 0:Malignant neoplasm of overlapping sites of left breast in female,  estrogen receptor positive  Z78.0:Postmenopausal  , Osteoporosis screening (L57.212)    FINDINGS:   1. Quality of the examination at the lumbar 1-4 spine is fair. Visible  morphological abnormalities are apparent.  Values at the spine are falsely  elevated likely due to presence of degenerative changes. Since L2 and L3  appear to contain the greatest progression, L1, L4 spine will be resulted.

## 2018-09-26 NOTE — PROGRESS NOTES
Whittier Records  1950 09/26/18    SUBJECTIVE:    Pt continues to struggle with back pain. She continues on cymbalta, ultram, celebrex, gabapentin. She has noticed that the brand name celebrex works better than the generic. Pt with DM - she is on no meds for this. The patient is taking hypertensive medications compliantly without side effects. Denies chest pain, dyspnea, edema, or TIA's. Patient denies any chest pain, shortness of breath, myalgias, Patient is tolerating cholesterol medications without difficulty. OBJECTIVE:    /74   Pulse 84   Resp 18   Wt 257 lb 6.4 oz (116.8 kg)   LMP  (LMP Unknown)   SpO2 95%   BMI 45.60 kg/m²     Physical Exam   Constitutional: She is oriented to person, place, and time. She appears well-developed and well-nourished. Eyes: Conjunctivae are normal. No scleral icterus. Neck: Neck supple. No tracheal deviation present. No thyromegaly present. Cardiovascular: Normal rate, regular rhythm and intact distal pulses. Exam reveals no gallop and no friction rub. No murmur heard. Pulses:       Dorsalis pedis pulses are 2+ on the right side, and 2+ on the left side. Posterior tibial pulses are 2+ on the right side, and 2+ on the left side. Pulmonary/Chest: No respiratory distress. She has no wheezes. She has no rales. Abdominal: Soft. Bowel sounds are normal. She exhibits no distension and no mass. There is no hepatomegaly. There is no tenderness. There is no rebound and no guarding. Lymphadenopathy:     She has no cervical adenopathy. Neurological: She is alert and oriented to person, place, and time. No sensory deficit (to monofilament). Skin: Skin is warm, dry and intact. No cyanosis. Nails show no clubbing. No foot ulcerations   Psychiatric: She has a normal mood and affect. Her behavior is normal. Judgment normal.       ASSESSMENT:    1.  Controlled type 2 diabetes mellitus without complication, without long-term current use of

## 2018-09-27 LAB
ESTIMATED AVERAGE GLUCOSE: 151.3 MG/DL
HBA1C MFR BLD: 6.9 %

## 2018-09-28 DIAGNOSIS — D64.9 ANEMIA, UNSPECIFIED TYPE: Primary | ICD-10-CM

## 2018-09-28 LAB
FERRITIN: 14.8 NG/ML (ref 15–150)
IRON SATURATION: 9 % (ref 15–50)
IRON: 39 UG/DL (ref 37–145)
TOTAL IRON BINDING CAPACITY: 454 UG/DL (ref 260–445)

## 2018-10-01 DIAGNOSIS — D50.8 OTHER IRON DEFICIENCY ANEMIA: Primary | ICD-10-CM

## 2018-10-18 NOTE — PROGRESS NOTES
1. Hx of anesthesia complications? --no  2. Hx of difficult airway/intubation? --no  3. Hx of changed chest pain/CHF exacerbation in last 6 mo. ? --no  4. Home O2 dependent? --no  5. Able to climb one flight of stairs w/o SOB? --yes  6.  Recent COPD exacerbation or pneumonia/bronchitis that has been treated in last      month? --no

## 2018-10-22 ENCOUNTER — ANESTHESIA (OUTPATIENT)
Dept: OPERATING ROOM | Age: 68
End: 2018-10-22
Payer: COMMERCIAL

## 2018-10-22 ENCOUNTER — HOSPITAL ENCOUNTER (OUTPATIENT)
Age: 68
Setting detail: OUTPATIENT SURGERY
Discharge: HOME | End: 2018-10-22
Attending: SPECIALIST | Admitting: SPECIALIST
Payer: COMMERCIAL

## 2018-10-22 ENCOUNTER — ANESTHESIA EVENT (OUTPATIENT)
Dept: OPERATING ROOM | Age: 68
End: 2018-10-22
Payer: COMMERCIAL

## 2018-10-22 VITALS
RESPIRATION RATE: 16 BRPM | SYSTOLIC BLOOD PRESSURE: 116 MMHG | WEIGHT: 252 LBS | HEART RATE: 79 BPM | BODY MASS INDEX: 46.38 KG/M2 | DIASTOLIC BLOOD PRESSURE: 68 MMHG | TEMPERATURE: 98.5 F | OXYGEN SATURATION: 96 % | HEIGHT: 62 IN

## 2018-10-22 VITALS
DIASTOLIC BLOOD PRESSURE: 105 MMHG | OXYGEN SATURATION: 100 % | SYSTOLIC BLOOD PRESSURE: 123 MMHG | RESPIRATION RATE: 16 BRPM

## 2018-10-22 PROCEDURE — 7100000011 HC PHASE II RECOVERY - ADDTL 15 MIN: Performed by: SPECIALIST

## 2018-10-22 PROCEDURE — 2580000003 HC RX 258: Performed by: SPECIALIST

## 2018-10-22 PROCEDURE — 3700000001 HC ADD 15 MINUTES (ANESTHESIA): Performed by: SPECIALIST

## 2018-10-22 PROCEDURE — 7100000010 HC PHASE II RECOVERY - FIRST 15 MIN: Performed by: SPECIALIST

## 2018-10-22 PROCEDURE — 2500000003 HC RX 250 WO HCPCS: Performed by: NURSE ANESTHETIST, CERTIFIED REGISTERED

## 2018-10-22 PROCEDURE — 3609009500 HC COLONOSCOPY DIAGNOSTIC OR SCREENING: Performed by: SPECIALIST

## 2018-10-22 PROCEDURE — 2709999900 HC NON-CHARGEABLE SUPPLY: Performed by: SPECIALIST

## 2018-10-22 PROCEDURE — 6360000002 HC RX W HCPCS: Performed by: NURSE ANESTHETIST, CERTIFIED REGISTERED

## 2018-10-22 PROCEDURE — 3700000000 HC ANESTHESIA ATTENDED CARE: Performed by: SPECIALIST

## 2018-10-22 PROCEDURE — 3609012400 HC EGD TRANSORAL BIOPSY SINGLE/MULTIPLE: Performed by: SPECIALIST

## 2018-10-22 RX ORDER — SODIUM CHLORIDE, SODIUM LACTATE, POTASSIUM CHLORIDE, CALCIUM CHLORIDE 600; 310; 30; 20 MG/100ML; MG/100ML; MG/100ML; MG/100ML
INJECTION, SOLUTION INTRAVENOUS CONTINUOUS
Status: DISCONTINUED | OUTPATIENT
Start: 2018-10-22 | End: 2018-10-22 | Stop reason: HOSPADM

## 2018-10-22 RX ORDER — OMEPRAZOLE 20 MG/1
20 CAPSULE, DELAYED RELEASE ORAL DAILY
Qty: 30 CAPSULE | Refills: 3 | Status: SHIPPED | OUTPATIENT
Start: 2018-10-22 | End: 2018-10-22

## 2018-10-22 RX ORDER — PROPOFOL 10 MG/ML
INJECTION, EMULSION INTRAVENOUS PRN
Status: DISCONTINUED | OUTPATIENT
Start: 2018-10-22 | End: 2018-10-22 | Stop reason: SDUPTHER

## 2018-10-22 RX ORDER — LIDOCAINE HYDROCHLORIDE 20 MG/ML
INJECTION, SOLUTION INFILTRATION; PERINEURAL PRN
Status: DISCONTINUED | OUTPATIENT
Start: 2018-10-22 | End: 2018-10-22 | Stop reason: SDUPTHER

## 2018-10-22 RX ORDER — OMEPRAZOLE 20 MG/1
20 CAPSULE, DELAYED RELEASE ORAL DAILY
Qty: 30 CAPSULE | Refills: 5 | Status: SHIPPED | OUTPATIENT
Start: 2018-10-22 | End: 2019-08-09 | Stop reason: SDUPTHER

## 2018-10-22 RX ORDER — FENTANYL CITRATE 50 UG/ML
INJECTION, SOLUTION INTRAMUSCULAR; INTRAVENOUS PRN
Status: DISCONTINUED | OUTPATIENT
Start: 2018-10-22 | End: 2018-10-22 | Stop reason: SDUPTHER

## 2018-10-22 RX ADMIN — SODIUM CHLORIDE, POTASSIUM CHLORIDE, SODIUM LACTATE AND CALCIUM CHLORIDE: 600; 310; 30; 20 INJECTION, SOLUTION INTRAVENOUS at 12:43

## 2018-10-22 RX ADMIN — PHENYLEPHRINE HYDROCHLORIDE 100 MCG: 10 INJECTION INTRAVENOUS at 13:58

## 2018-10-22 RX ADMIN — PROPOFOL 20 MG: 10 INJECTION, EMULSION INTRAVENOUS at 13:56

## 2018-10-22 RX ADMIN — PROPOFOL 10 MG: 10 INJECTION, EMULSION INTRAVENOUS at 14:01

## 2018-10-22 RX ADMIN — PHENYLEPHRINE HYDROCHLORIDE 200 MCG: 10 INJECTION INTRAVENOUS at 14:01

## 2018-10-22 RX ADMIN — PROPOFOL 100 MG: 10 INJECTION, EMULSION INTRAVENOUS at 13:48

## 2018-10-22 RX ADMIN — PROPOFOL 10 MG: 10 INJECTION, EMULSION INTRAVENOUS at 14:09

## 2018-10-22 RX ADMIN — PROPOFOL 20 MG: 10 INJECTION, EMULSION INTRAVENOUS at 13:57

## 2018-10-22 RX ADMIN — PROPOFOL 20 MG: 10 INJECTION, EMULSION INTRAVENOUS at 13:52

## 2018-10-22 RX ADMIN — PROPOFOL 80 MG: 10 INJECTION, EMULSION INTRAVENOUS at 14:07

## 2018-10-22 RX ADMIN — PROPOFOL 20 MG: 10 INJECTION, EMULSION INTRAVENOUS at 14:02

## 2018-10-22 RX ADMIN — PROPOFOL 20 MG: 10 INJECTION, EMULSION INTRAVENOUS at 14:08

## 2018-10-22 RX ADMIN — FENTANYL CITRATE 50 MCG: 50 INJECTION INTRAMUSCULAR; INTRAVENOUS at 13:55

## 2018-10-22 RX ADMIN — PROPOFOL 20 MG: 10 INJECTION, EMULSION INTRAVENOUS at 13:49

## 2018-10-22 RX ADMIN — PHENYLEPHRINE HYDROCHLORIDE 200 MCG: 10 INJECTION INTRAVENOUS at 14:04

## 2018-10-22 RX ADMIN — LIDOCAINE HYDROCHLORIDE 50 MG: 20 INJECTION, SOLUTION INFILTRATION; PERINEURAL at 13:48

## 2018-10-22 RX ADMIN — PROPOFOL 20 MG: 10 INJECTION, EMULSION INTRAVENOUS at 13:50

## 2018-10-22 RX ADMIN — PHENYLEPHRINE HYDROCHLORIDE 100 MCG: 10 INJECTION INTRAVENOUS at 13:55

## 2018-10-22 RX ADMIN — PHENYLEPHRINE HYDROCHLORIDE 200 MCG: 10 INJECTION INTRAVENOUS at 14:07

## 2018-10-22 RX ADMIN — LIDOCAINE HYDROCHLORIDE 50 MG: 20 INJECTION, SOLUTION INFILTRATION; PERINEURAL at 14:07

## 2018-10-22 RX ADMIN — PROPOFOL 40 MG: 10 INJECTION, EMULSION INTRAVENOUS at 13:51

## 2018-10-22 RX ADMIN — PROPOFOL 20 MG: 10 INJECTION, EMULSION INTRAVENOUS at 13:54

## 2018-10-22 ASSESSMENT — PAIN SCALES - GENERAL
PAINLEVEL_OUTOF10: 0
PAINLEVEL_OUTOF10: 0

## 2018-10-22 ASSESSMENT — PAIN - FUNCTIONAL ASSESSMENT: PAIN_FUNCTIONAL_ASSESSMENT: 0-10

## 2018-10-22 NOTE — BRIEF OP NOTE
BRIEF COLONOSCOPY REPORT:    Impression:    1) small internal hemorrhoids    2) otherwise normal        BRIEF EGD REPORT:    Impression:    1) 8 mm, clean-based ulcer superior aspect of duodenal bulb    2) large hiatal hernia   3) otherwise normal          SUGGEST:   1) add omeprazole as long as needs NSAID's       The complete operative report (including photos) is available in the following locations:   1) soft chart now   2) report will also be scanned and can then be found by going to \"chart review\" then \"notes\" then \"op report\" or by going to \"chart review\" then \"media\" then \"op report\". For review of photos, may need to go to page 2.

## 2018-11-12 DIAGNOSIS — M54.16 RADICULOPATHY, LUMBAR REGION: ICD-10-CM

## 2018-11-12 RX ORDER — TRAMADOL HYDROCHLORIDE 50 MG/1
50 TABLET ORAL EVERY 6 HOURS PRN
Qty: 60 TABLET | Refills: 0 | Status: SHIPPED | OUTPATIENT
Start: 2018-11-12 | End: 2018-12-27 | Stop reason: SDUPTHER

## 2018-11-26 ENCOUNTER — OFFICE VISIT (OUTPATIENT)
Dept: INTERNAL MEDICINE CLINIC | Age: 68
End: 2018-11-26
Payer: COMMERCIAL

## 2018-11-26 VITALS
OXYGEN SATURATION: 96 % | SYSTOLIC BLOOD PRESSURE: 116 MMHG | RESPIRATION RATE: 16 BRPM | DIASTOLIC BLOOD PRESSURE: 72 MMHG | HEART RATE: 96 BPM

## 2018-11-26 DIAGNOSIS — J40 BRONCHITIS: Primary | ICD-10-CM

## 2018-11-26 PROCEDURE — 99213 OFFICE O/P EST LOW 20 MIN: CPT | Performed by: INTERNAL MEDICINE

## 2018-11-26 RX ORDER — BENZONATATE 100 MG/1
100 CAPSULE ORAL 3 TIMES DAILY PRN
Qty: 30 CAPSULE | Refills: 0 | Status: SHIPPED | OUTPATIENT
Start: 2018-11-26 | End: 2018-12-03

## 2018-11-26 RX ORDER — PREDNISONE 10 MG/1
TABLET ORAL
Qty: 20 TABLET | Refills: 0 | Status: SHIPPED | OUTPATIENT
Start: 2018-11-26 | End: 2018-12-27 | Stop reason: ALTCHOICE

## 2018-12-02 ENCOUNTER — PATIENT MESSAGE (OUTPATIENT)
Dept: INTERNAL MEDICINE CLINIC | Age: 68
End: 2018-12-02

## 2018-12-03 RX ORDER — AZITHROMYCIN 250 MG/1
TABLET, FILM COATED ORAL
Qty: 1 PACKET | Refills: 0 | Status: SHIPPED | OUTPATIENT
Start: 2018-12-03 | End: 2018-12-27 | Stop reason: ALTCHOICE

## 2018-12-20 NOTE — ANESTHESIA PRE PROCEDURE
MD   mometasone (NASONEX) 50 MCG/ACT nasal spray INHALE 2 SPRAYS INTO EACH NOSRTIL ONCE DAILY 3/22/17   Valentin Monge MD   furosemide (LASIX) 20 MG tablet Take 20 mg by mouth 2/28/17   Historical Provider, MD   losartan (COZAAR) 50 MG tablet Take 50 mg by mouth daily  2/28/17   Historical Provider, MD   Cholecalciferol (VITAMIN D3) 1000 UNITS TABS Take 2 tablets by mouth daily 5/24/16   Valentin Monge MD   albuterol (PROVENTIL HFA;VENTOLIN HFA) 108 (90 BASE) MCG/ACT inhaler Inhale 2 puffs into the lungs every 6 hours as needed. 10/20/14   Valentin Monge MD   potassium chloride SA (K-DUR;KLOR-CON) 10 MEQ tablet Take 1 tablet by mouth daily. 2/4/13 2/23/14  Valentin Monge MD       Current medications:    No current facility-administered medications for this encounter. Current Outpatient Prescriptions   Medication Sig Dispense Refill    anastrozole (ARIMIDEX) 1 MG tablet Take 1 tablet by mouth daily 90 tablet 3    CELEBREX 100 MG capsule Take 1 capsule by mouth 2 times daily 60 capsule 11    DULoxetine (CYMBALTA) 60 MG extended release capsule TAKE 1 CAPSULE BY MOUTH DAILY 30 capsule 11    magnesium 30 MG tablet Take 50 mg by mouth 2 times daily      vitamin E 1000 units capsule Take 1,000 Units by mouth daily      hydrochlorothiazide (HYDRODIURIL) 25 MG tablet TAKE 1 TABLET BY MOUTH EVERY DAY 30 tablet 10    celecoxib (CELEBREX) 100 MG capsule TAKE 1 CAPSULE BY MOUTH 2 TIMES DAILY 60 capsule 11    gabapentin (NEURONTIN) 100 MG capsule Take 1 capsule by mouth nightly for 360 days. . 90 capsule 3    atorvastatin (LIPITOR) 40 MG tablet TAKE 1 TABLET BY MOUTH DAILY.  30 tablet 11    aspirin (ASPIRIN CHILDRENS) 81 MG chewable tablet Take 1 tablet by mouth daily 30 tablet 3    UNABLE TO FIND Please administer two SHINGRIX vaccines 2-6 months apart 2 Units 0    potassium chloride (KLOR-CON M) 20 MEQ TBCR extended release tablet Take 1 tablet by mouth 2 times daily 60 tablet 11    albuterol (ACCUNEB) 1.25 MG/3ML nebulizer solution Inhale 3 mLs into the lungs every 6 hours as needed for Wheezing 30 vial 11    mometasone (NASONEX) 50 MCG/ACT nasal spray INHALE 2 SPRAYS INTO EACH NOSRTIL ONCE DAILY 1 Inhaler 11    furosemide (LASIX) 20 MG tablet Take 20 mg by mouth      losartan (COZAAR) 50 MG tablet Take 50 mg by mouth daily       Cholecalciferol (VITAMIN D3) 1000 UNITS TABS Take 2 tablets by mouth daily 30 tablet 0    albuterol (PROVENTIL HFA;VENTOLIN HFA) 108 (90 BASE) MCG/ACT inhaler Inhale 2 puffs into the lungs every 6 hours as needed. 1 Inhaler 11       Allergies:     Allergies   Allergen Reactions    Adhesive Tape        Problem List:    Patient Active Problem List   Diagnosis Code    Hypercholesterolemia E78.00    Hypokalemia E87.6    Calf pain M79.669    Radiculopathy, lumbar region M54.16    Neck pain M54.2    Paresthesias R20.2    Alopecia L65.9    Allergic rhinitis J30.9    Vitamin D deficiency E55.9    Dysphagia R13.10    Essential hypertension I10    Malignant neoplasm of axillary tail of left female breast ([facility] [address] C50.612    Generalized osteoarthritis M15.9    Controlled type 2 diabetes mellitus without complication, without long-term current use of insulin (HCC) E11.9       Past Medical History:        Diagnosis Date    Alopecia     Arthritis     Asthma     allergy or cold induced    Calf pain     right    Cancer (HCC)     left breast cancer    Diabetes mellitus ([address]     Dysphagia     11/12 EGD lg hiatal hernia, esophageal ring s/p dilitation, 5mm polyp    Edema     HTN (hypertension)     Hx of colonoscopy     11/12 C-scope WNL    Hypercholesterolemia     Hypokalemia     Low back pain     Neck pain     5/9 MRI - mod-severe degenerative changes, c3-4 left mod foraminal narrowing; severe righ C4-5 foraminal narrowing, C5-6 severe right foraminal narrowing; small herniation T2-3    Paresthesias     Radiculopathy, lumbar region     4/9 MRI Walk in

## 2018-12-27 ENCOUNTER — OFFICE VISIT (OUTPATIENT)
Dept: INTERNAL MEDICINE CLINIC | Age: 68
End: 2018-12-27
Payer: COMMERCIAL

## 2018-12-27 VITALS
RESPIRATION RATE: 18 BRPM | DIASTOLIC BLOOD PRESSURE: 80 MMHG | BODY MASS INDEX: 46.82 KG/M2 | HEART RATE: 88 BPM | WEIGHT: 256 LBS | OXYGEN SATURATION: 94 % | SYSTOLIC BLOOD PRESSURE: 134 MMHG

## 2018-12-27 DIAGNOSIS — R05.9 COUGH: Primary | ICD-10-CM

## 2018-12-27 DIAGNOSIS — Z13.31 POSITIVE DEPRESSION SCREENING: ICD-10-CM

## 2018-12-27 DIAGNOSIS — E11.9 CONTROLLED TYPE 2 DIABETES MELLITUS WITHOUT COMPLICATION, WITHOUT LONG-TERM CURRENT USE OF INSULIN (HCC): ICD-10-CM

## 2018-12-27 DIAGNOSIS — R05.9 COUGH: ICD-10-CM

## 2018-12-27 DIAGNOSIS — M54.16 RADICULOPATHY, LUMBAR REGION: ICD-10-CM

## 2018-12-27 DIAGNOSIS — I10 ESSENTIAL HYPERTENSION: ICD-10-CM

## 2018-12-27 PROCEDURE — 99214 OFFICE O/P EST MOD 30 MIN: CPT | Performed by: INTERNAL MEDICINE

## 2018-12-27 PROCEDURE — G8431 POS CLIN DEPRES SCRN F/U DOC: HCPCS | Performed by: INTERNAL MEDICINE

## 2018-12-27 RX ORDER — FLUTICASONE PROPIONATE 110 UG/1
2 AEROSOL, METERED RESPIRATORY (INHALATION) 2 TIMES DAILY
Qty: 1 INHALER | Refills: 3 | Status: SHIPPED | OUTPATIENT
Start: 2018-12-27 | End: 2018-12-27 | Stop reason: SDUPTHER

## 2018-12-27 RX ORDER — ALBUTEROL SULFATE 90 UG/1
2 AEROSOL, METERED RESPIRATORY (INHALATION) EVERY 6 HOURS PRN
Qty: 1 INHALER | Refills: 11 | Status: SHIPPED | OUTPATIENT
Start: 2018-12-27 | End: 2020-06-17 | Stop reason: ALTCHOICE

## 2018-12-27 RX ORDER — TRAMADOL HYDROCHLORIDE 50 MG/1
50 TABLET ORAL EVERY 6 HOURS PRN
Qty: 60 TABLET | Refills: 0 | Status: SHIPPED | OUTPATIENT
Start: 2018-12-27 | End: 2019-03-11 | Stop reason: SDUPTHER

## 2018-12-27 RX ORDER — FLUTICASONE PROPIONATE 110 UG/1
2 AEROSOL, METERED RESPIRATORY (INHALATION) 2 TIMES DAILY
Qty: 1 INHALER | Refills: 3 | Status: SHIPPED | OUTPATIENT
Start: 2018-12-27 | End: 2019-09-25

## 2018-12-27 NOTE — PROGRESS NOTES
Jacoby Hargrove  1950 12/27/18    SUBJECTIVE:    Pt continues to have some cough productive of clear and white sputum and right ear pain. She has some PND. The patient is taking hypertensive medications compliantly without side effects. Denies chest pain, dyspnea, edema, or TIA's. Pt does not check blood sugars. Tramadol provides benefit for her arthritis pain. She continues on celebrex. Mood is doing OK - she does have some blues in the winter. OBJECTIVE:    /80   Pulse 88   Resp 18   Wt 256 lb (116.1 kg)   LMP  (LMP Unknown)   SpO2 94%   BMI 46.82 kg/m²     Physical Exam   Constitutional: She is oriented to person, place, and time. She appears well-developed and well-nourished. Eyes: Conjunctivae are normal. No scleral icterus. Neck: Neck supple. No tracheal deviation present. No thyromegaly present. Cardiovascular: Normal rate, regular rhythm and intact distal pulses. Exam reveals no gallop and no friction rub. No murmur heard. Pulmonary/Chest: No respiratory distress. She has wheezes. She has no rales. Abdominal: Soft. Bowel sounds are normal. She exhibits no distension and no mass. There is no hepatomegaly. There is no tenderness. There is no rebound and no guarding. Lymphadenopathy:     She has no cervical adenopathy. Neurological: She is alert and oriented to person, place, and time. Skin: No cyanosis. Nails show no clubbing. Psychiatric: She has a normal mood and affect. Her behavior is normal. Judgment normal.       ASSESSMENT:    1. Cough    2. Radiculopathy, lumbar region    3. Controlled type 2 diabetes mellitus without complication, without long-term current use of insulin ([address]    4. Essential hypertension    5. Positive depression screening        PLAN:    Yesica Adamson was seen today for hyperlipidemia. Diagnoses and all orders for this visit:    Cough - some wheezing as well.  Will try flovent, pt will call in 1 week to let me know response  -

## 2018-12-28 LAB
ESTIMATED AVERAGE GLUCOSE: 168.6 MG/DL
HBA1C MFR BLD: 7.5 %

## 2019-01-10 RX ORDER — POTASSIUM CHLORIDE 1500 MG/1
20 TABLET, EXTENDED RELEASE ORAL 2 TIMES DAILY
Qty: 60 TABLET | Refills: 11 | Status: SHIPPED | OUTPATIENT
Start: 2019-01-10 | End: 2019-09-25 | Stop reason: SDUPTHER

## 2019-03-06 RX ORDER — ATORVASTATIN CALCIUM 40 MG/1
TABLET, FILM COATED ORAL
Qty: 30 TABLET | Refills: 11 | Status: SHIPPED | OUTPATIENT
Start: 2019-03-06 | End: 2019-09-25 | Stop reason: SDUPTHER

## 2019-03-11 DIAGNOSIS — M54.16 RADICULOPATHY, LUMBAR REGION: ICD-10-CM

## 2019-03-11 RX ORDER — TRAMADOL HYDROCHLORIDE 50 MG/1
50 TABLET ORAL EVERY 6 HOURS PRN
Qty: 60 TABLET | Refills: 0 | Status: SHIPPED | OUTPATIENT
Start: 2019-03-11 | End: 2019-03-29 | Stop reason: SDUPTHER

## 2019-03-29 ENCOUNTER — OFFICE VISIT (OUTPATIENT)
Dept: INTERNAL MEDICINE CLINIC | Age: 69
End: 2019-03-29
Payer: COMMERCIAL

## 2019-03-29 VITALS
DIASTOLIC BLOOD PRESSURE: 76 MMHG | RESPIRATION RATE: 18 BRPM | WEIGHT: 252 LBS | SYSTOLIC BLOOD PRESSURE: 130 MMHG | BODY MASS INDEX: 46.09 KG/M2 | HEART RATE: 80 BPM | OXYGEN SATURATION: 95 %

## 2019-03-29 DIAGNOSIS — I10 ESSENTIAL HYPERTENSION: ICD-10-CM

## 2019-03-29 DIAGNOSIS — E11.9 CONTROLLED TYPE 2 DIABETES MELLITUS WITHOUT COMPLICATION, WITHOUT LONG-TERM CURRENT USE OF INSULIN (HCC): ICD-10-CM

## 2019-03-29 DIAGNOSIS — M54.16 RADICULOPATHY, LUMBAR REGION: ICD-10-CM

## 2019-03-29 DIAGNOSIS — E78.00 HYPERCHOLESTEROLEMIA: ICD-10-CM

## 2019-03-29 DIAGNOSIS — E78.00 HYPERCHOLESTEROLEMIA: Primary | ICD-10-CM

## 2019-03-29 LAB
A/G RATIO: 1.3 (ref 1.1–2.2)
ALBUMIN SERPL-MCNC: 4.3 G/DL (ref 3.4–5)
ALP BLD-CCNC: 121 U/L (ref 40–129)
ALT SERPL-CCNC: 17 U/L (ref 10–40)
ANION GAP SERPL CALCULATED.3IONS-SCNC: 13 MMOL/L (ref 3–16)
AST SERPL-CCNC: 18 U/L (ref 15–37)
BASOPHILS ABSOLUTE: 0.1 K/UL (ref 0–0.2)
BASOPHILS RELATIVE PERCENT: 2 %
BILIRUB SERPL-MCNC: 0.4 MG/DL (ref 0–1)
BUN BLDV-MCNC: 12 MG/DL (ref 7–20)
CALCIUM SERPL-MCNC: 9.8 MG/DL (ref 8.3–10.6)
CHLORIDE BLD-SCNC: 97 MMOL/L (ref 99–110)
CHOLESTEROL, TOTAL: 189 MG/DL (ref 0–199)
CO2: 29 MMOL/L (ref 21–32)
CREAT SERPL-MCNC: <0.5 MG/DL (ref 0.6–1.2)
EOSINOPHILS ABSOLUTE: 0.2 K/UL (ref 0–0.6)
EOSINOPHILS RELATIVE PERCENT: 3.6 %
GFR AFRICAN AMERICAN: >60
GFR NON-AFRICAN AMERICAN: >60
GLOBULIN: 3.4 G/DL
GLUCOSE BLD-MCNC: 132 MG/DL (ref 70–99)
HCT VFR BLD CALC: 33.9 % (ref 36–48)
HDLC SERPL-MCNC: 52 MG/DL (ref 40–60)
HEMOGLOBIN: 10.4 G/DL (ref 12–16)
LDL CHOLESTEROL CALCULATED: 108 MG/DL
LYMPHOCYTES ABSOLUTE: 1.4 K/UL (ref 1–5.1)
LYMPHOCYTES RELATIVE PERCENT: 22.2 %
MCH RBC QN AUTO: 23.2 PG (ref 26–34)
MCHC RBC AUTO-ENTMCNC: 30.6 G/DL (ref 31–36)
MCV RBC AUTO: 75.7 FL (ref 80–100)
MONOCYTES ABSOLUTE: 0.5 K/UL (ref 0–1.3)
MONOCYTES RELATIVE PERCENT: 8.3 %
NEUTROPHILS ABSOLUTE: 4.1 K/UL (ref 1.7–7.7)
NEUTROPHILS RELATIVE PERCENT: 63.9 %
PDW BLD-RTO: 20.2 % (ref 12.4–15.4)
PLATELET # BLD: 488 K/UL (ref 135–450)
PMV BLD AUTO: 8.5 FL (ref 5–10.5)
POTASSIUM SERPL-SCNC: 4.4 MMOL/L (ref 3.5–5.1)
RBC # BLD: 4.48 M/UL (ref 4–5.2)
SODIUM BLD-SCNC: 139 MMOL/L (ref 136–145)
TOTAL PROTEIN: 7.7 G/DL (ref 6.4–8.2)
TRIGL SERPL-MCNC: 147 MG/DL (ref 0–150)
VLDLC SERPL CALC-MCNC: 29 MG/DL
WBC # BLD: 6.5 K/UL (ref 4–11)

## 2019-03-29 PROCEDURE — 99214 OFFICE O/P EST MOD 30 MIN: CPT | Performed by: INTERNAL MEDICINE

## 2019-03-29 RX ORDER — TRAMADOL HYDROCHLORIDE 50 MG/1
50 TABLET ORAL EVERY 6 HOURS PRN
Qty: 60 TABLET | Refills: 0 | Status: SHIPPED | OUTPATIENT
Start: 2019-03-29 | End: 2019-06-25 | Stop reason: SDUPTHER

## 2019-03-29 ASSESSMENT — PATIENT HEALTH QUESTIONNAIRE - PHQ9
1. LITTLE INTEREST OR PLEASURE IN DOING THINGS: 0
SUM OF ALL RESPONSES TO PHQ QUESTIONS 1-9: 0
2. FEELING DOWN, DEPRESSED OR HOPELESS: 0
SUM OF ALL RESPONSES TO PHQ QUESTIONS 1-9: 0
SUM OF ALL RESPONSES TO PHQ9 QUESTIONS 1 & 2: 0

## 2019-03-30 LAB
ESTIMATED AVERAGE GLUCOSE: 168.6 MG/DL
HBA1C MFR BLD: 7.5 %

## 2019-04-01 RX ORDER — FERROUS SULFATE 325(65) MG
325 TABLET ORAL DAILY
Qty: 180 TABLET | Refills: 1 | Status: SHIPPED | OUTPATIENT
Start: 2019-04-01 | End: 2019-09-25 | Stop reason: SDUPTHER

## 2019-05-07 DIAGNOSIS — R05.9 COUGH: ICD-10-CM

## 2019-05-07 RX ORDER — HYDROCHLOROTHIAZIDE 25 MG/1
TABLET ORAL
Qty: 30 TABLET | Refills: 9 | Status: SHIPPED | OUTPATIENT
Start: 2019-05-07 | End: 2019-09-25 | Stop reason: SDUPTHER

## 2019-05-07 RX ORDER — DEXAMETHASONE 4 MG/1
TABLET ORAL
Qty: 12 INHALER | Refills: 3 | Status: SHIPPED | OUTPATIENT
Start: 2019-05-07 | End: 2019-08-29 | Stop reason: SDUPTHER

## 2019-05-08 RX ORDER — LOSARTAN POTASSIUM 50 MG/1
50 TABLET ORAL DAILY
Qty: 90 TABLET | Refills: 3 | Status: SHIPPED | OUTPATIENT
Start: 2019-05-08 | End: 2019-09-25 | Stop reason: SDUPTHER

## 2019-06-13 ENCOUNTER — OFFICE VISIT (OUTPATIENT)
Dept: INTERNAL MEDICINE CLINIC | Age: 69
End: 2019-06-13
Payer: COMMERCIAL

## 2019-06-13 ENCOUNTER — HOSPITAL ENCOUNTER (OUTPATIENT)
Dept: GENERAL RADIOLOGY | Age: 69
Discharge: HOME | End: 2019-06-13
Payer: COMMERCIAL

## 2019-06-13 ENCOUNTER — HOSPITAL ENCOUNTER (OUTPATIENT)
Age: 69
Discharge: HOME | End: 2019-06-13
Payer: COMMERCIAL

## 2019-06-13 VITALS
SYSTOLIC BLOOD PRESSURE: 122 MMHG | BODY MASS INDEX: 47.37 KG/M2 | WEIGHT: 259 LBS | DIASTOLIC BLOOD PRESSURE: 72 MMHG | OXYGEN SATURATION: 94 % | RESPIRATION RATE: 18 BRPM | HEART RATE: 84 BPM

## 2019-06-13 DIAGNOSIS — M25.532 LEFT WRIST PAIN: ICD-10-CM

## 2019-06-13 DIAGNOSIS — M25.532 LEFT WRIST PAIN: Primary | ICD-10-CM

## 2019-06-13 PROCEDURE — 73130 X-RAY EXAM OF HAND: CPT

## 2019-06-13 PROCEDURE — 99213 OFFICE O/P EST LOW 20 MIN: CPT | Performed by: INTERNAL MEDICINE

## 2019-06-13 NOTE — PROGRESS NOTES
Shari Reddy  1950 06/13/19    SUBJECTIVE:    Pt with 3 months of swelling in the left dorsal hand. At one point she struck her hand on a table, but she is not sure if the erythema started prior to this. She has noticed that the swelling and erythema are getting larger. She denies any pain. Symptoms worsen with increased activity     She denies any F/C. She has not treated this. OBJECTIVE:    /72   Pulse 84   Resp 18   Wt 259 lb (117.5 kg)   LMP  (LMP Unknown)   SpO2 94%   BMI 47.37 kg/m²     Physical Exam  Mild swelling and erythema at 1st [facility] joint, mild tenderness; no warmth. Mild pain with movement of the MTP joint. ASSESSMENT:    1. Left wrist pain        PLAN:    Venkat Ch was seen today for hand pain. Diagnoses and all orders for this visit:    Left wrist pain - does not seem like a cellulitis. Perhaps this is from a soft tissue injury or from a small displaced fracture. I will check an xray, if (-) for fracture will Tx with a wrist brace. If not improving will refer to ortho  -     XR HAND LEFT (MIN 3 VIEWS);  Future

## 2019-06-25 ENCOUNTER — OFFICE VISIT (OUTPATIENT)
Dept: INTERNAL MEDICINE CLINIC | Age: 69
End: 2019-06-25
Payer: COMMERCIAL

## 2019-06-25 VITALS
SYSTOLIC BLOOD PRESSURE: 116 MMHG | RESPIRATION RATE: 18 BRPM | HEART RATE: 92 BPM | OXYGEN SATURATION: 95 % | DIASTOLIC BLOOD PRESSURE: 68 MMHG

## 2019-06-25 DIAGNOSIS — M51.36 DEGENERATIVE DISC DISEASE, LUMBAR: ICD-10-CM

## 2019-06-25 DIAGNOSIS — M54.16 RADICULOPATHY, LUMBAR REGION: ICD-10-CM

## 2019-06-25 PROCEDURE — 99213 OFFICE O/P EST LOW 20 MIN: CPT | Performed by: INTERNAL MEDICINE

## 2019-06-25 RX ORDER — DULOXETIN HYDROCHLORIDE 30 MG/1
30 CAPSULE, DELAYED RELEASE ORAL DAILY
Qty: 30 CAPSULE | Refills: 3 | Status: SHIPPED | OUTPATIENT
Start: 2019-06-25 | End: 2019-08-09 | Stop reason: SDUPTHER

## 2019-06-25 RX ORDER — TRAMADOL HYDROCHLORIDE 50 MG/1
50 TABLET ORAL EVERY 6 HOURS PRN
Qty: 60 TABLET | Refills: 0 | Status: SHIPPED | OUTPATIENT
Start: 2019-06-25 | End: 2019-09-06 | Stop reason: SDUPTHER

## 2019-06-25 NOTE — PROGRESS NOTES
Ирина Turk  1950 06/25/19    SUBJECTIVE:    The patient is taking hypertensive medications compliantly without side effects. Denies chest pain, dyspnea, edema, or TIA's. She intermittenlty takes the lasix. Pt is interested in decreasing cymbalta. She continues on tramadol for the back pain, OA pain. She continues on celebrex. She uses flovent one puff BID - this has been beneficial for her breathing. OBJECTIVE:    /68   Pulse 92   Resp 18   LMP  (LMP Unknown)   SpO2 95%     Physical Exam    ASSESSMENT:    1. Radiculopathy, lumbar region    2. Degenerative disc disease, lumbar        PLAN:    Ovi Byrnes was seen today for medication refill. Diagnoses and all orders for this visit:    Radiculopathy, lumbar region - cont tramadol, but will decrease the cymbalta to see how much benefit this is providing  -     traMADol (ULTRAM) 50 MG tablet; Take 1 tablet by mouth every 6 hours as needed for Pain for up to 30 days. Degenerative disc disease, lumbar    Other orders  -     DULoxetine (CYMBALTA) 30 MG extended release capsule;  Take 1 capsule by mouth daily

## 2019-07-17 RX ORDER — GABAPENTIN 100 MG/1
CAPSULE ORAL
Qty: 90 CAPSULE | Refills: 3 | Status: SHIPPED | OUTPATIENT
Start: 2019-07-17 | End: 2019-07-18 | Stop reason: SDUPTHER

## 2019-07-18 RX ORDER — GABAPENTIN 100 MG/1
100 CAPSULE ORAL NIGHTLY
Qty: 90 CAPSULE | Refills: 3 | Status: SHIPPED | OUTPATIENT
Start: 2019-07-18 | End: 2019-09-25 | Stop reason: SDUPTHER

## 2019-08-09 RX ORDER — DULOXETIN HYDROCHLORIDE 30 MG/1
30 CAPSULE, DELAYED RELEASE ORAL DAILY
Qty: 30 CAPSULE | Refills: 3 | Status: SHIPPED | OUTPATIENT
Start: 2019-08-09 | End: 2019-09-25

## 2019-08-29 DIAGNOSIS — R05.9 COUGH: ICD-10-CM

## 2019-08-29 RX ORDER — DEXAMETHASONE 4 MG/1
TABLET ORAL
Qty: 36 INHALER | Refills: 1 | Status: SHIPPED | OUTPATIENT
Start: 2019-08-29 | End: 2020-06-17 | Stop reason: ALTCHOICE

## 2019-09-06 DIAGNOSIS — M54.16 RADICULOPATHY, LUMBAR REGION: ICD-10-CM

## 2019-09-06 RX ORDER — TRAMADOL HYDROCHLORIDE 50 MG/1
50 TABLET ORAL EVERY 6 HOURS PRN
Qty: 60 TABLET | Refills: 0 | Status: SHIPPED | OUTPATIENT
Start: 2019-09-06 | End: 2019-09-25 | Stop reason: SDUPTHER

## 2019-09-25 ENCOUNTER — OFFICE VISIT (OUTPATIENT)
Dept: INTERNAL MEDICINE CLINIC | Age: 69
End: 2019-09-25
Payer: MEDICARE

## 2019-09-25 VITALS
HEART RATE: 74 BPM | OXYGEN SATURATION: 96 % | DIASTOLIC BLOOD PRESSURE: 88 MMHG | WEIGHT: 255.6 LBS | BODY MASS INDEX: 46.75 KG/M2 | SYSTOLIC BLOOD PRESSURE: 128 MMHG

## 2019-09-25 DIAGNOSIS — I10 ESSENTIAL HYPERTENSION: ICD-10-CM

## 2019-09-25 DIAGNOSIS — M54.16 RADICULOPATHY, LUMBAR REGION: ICD-10-CM

## 2019-09-25 DIAGNOSIS — E78.00 HYPERCHOLESTEROLEMIA: ICD-10-CM

## 2019-09-25 DIAGNOSIS — M15.9 GENERALIZED OSTEOARTHRITIS: ICD-10-CM

## 2019-09-25 DIAGNOSIS — E11.9 CONTROLLED TYPE 2 DIABETES MELLITUS WITHOUT COMPLICATION, WITHOUT LONG-TERM CURRENT USE OF INSULIN (HCC): ICD-10-CM

## 2019-09-25 DIAGNOSIS — I10 ESSENTIAL HYPERTENSION: Primary | ICD-10-CM

## 2019-09-25 DIAGNOSIS — D64.9 ANEMIA, UNSPECIFIED TYPE: ICD-10-CM

## 2019-09-25 LAB
A/G RATIO: 1.2 (ref 1.1–2.2)
ALBUMIN SERPL-MCNC: 4.2 G/DL (ref 3.4–5)
ALP BLD-CCNC: 135 U/L (ref 40–129)
ALT SERPL-CCNC: 40 U/L (ref 10–40)
ANION GAP SERPL CALCULATED.3IONS-SCNC: 15 MMOL/L (ref 3–16)
AST SERPL-CCNC: 35 U/L (ref 15–37)
BASOPHILS ABSOLUTE: 0.1 K/UL (ref 0–0.2)
BASOPHILS RELATIVE PERCENT: 1.1 %
BILIRUB SERPL-MCNC: 0.5 MG/DL (ref 0–1)
BUN BLDV-MCNC: 11 MG/DL (ref 7–20)
CALCIUM SERPL-MCNC: 9.8 MG/DL (ref 8.3–10.6)
CHLORIDE BLD-SCNC: 98 MMOL/L (ref 99–110)
CHOLESTEROL, TOTAL: 183 MG/DL (ref 0–199)
CO2: 28 MMOL/L (ref 21–32)
CREAT SERPL-MCNC: 0.5 MG/DL (ref 0.6–1.2)
CREATININE URINE: 36.9 MG/DL (ref 28–259)
EOSINOPHILS ABSOLUTE: 0.2 K/UL (ref 0–0.6)
EOSINOPHILS RELATIVE PERCENT: 3.8 %
FERRITIN: 139.7 NG/ML (ref 15–150)
GFR AFRICAN AMERICAN: >60
GFR NON-AFRICAN AMERICAN: >60
GLOBULIN: 3.4 G/DL
GLUCOSE BLD-MCNC: 124 MG/DL (ref 70–99)
HCT VFR BLD CALC: 42.4 % (ref 36–48)
HDLC SERPL-MCNC: 63 MG/DL (ref 40–60)
HEMOGLOBIN: 14.2 G/DL (ref 12–16)
IRON SATURATION: 34 % (ref 15–50)
IRON: 132 UG/DL (ref 37–145)
LDL CHOLESTEROL CALCULATED: 94 MG/DL
LYMPHOCYTES ABSOLUTE: 1.8 K/UL (ref 1–5.1)
LYMPHOCYTES RELATIVE PERCENT: 28 %
MCH RBC QN AUTO: 32.7 PG (ref 26–34)
MCHC RBC AUTO-ENTMCNC: 33.5 G/DL (ref 31–36)
MCV RBC AUTO: 97.6 FL (ref 80–100)
MICROALBUMIN UR-MCNC: <1.2 MG/DL
MICROALBUMIN/CREAT UR-RTO: NORMAL MG/G (ref 0–30)
MONOCYTES ABSOLUTE: 0.5 K/UL (ref 0–1.3)
MONOCYTES RELATIVE PERCENT: 7.9 %
NEUTROPHILS ABSOLUTE: 3.7 K/UL (ref 1.7–7.7)
NEUTROPHILS RELATIVE PERCENT: 59.2 %
PDW BLD-RTO: 15.2 % (ref 12.4–15.4)
PLATELET # BLD: 313 K/UL (ref 135–450)
PMV BLD AUTO: 9.5 FL (ref 5–10.5)
POTASSIUM SERPL-SCNC: 4.7 MMOL/L (ref 3.5–5.1)
RBC # BLD: 4.35 M/UL (ref 4–5.2)
SODIUM BLD-SCNC: 141 MMOL/L (ref 136–145)
TOTAL IRON BINDING CAPACITY: 383 UG/DL (ref 260–445)
TOTAL PROTEIN: 7.6 G/DL (ref 6.4–8.2)
TRIGL SERPL-MCNC: 128 MG/DL (ref 0–150)
VLDLC SERPL CALC-MCNC: 26 MG/DL
WBC # BLD: 6.3 K/UL (ref 4–11)

## 2019-09-25 PROCEDURE — 2022F DILAT RTA XM EVC RTNOPTHY: CPT | Performed by: INTERNAL MEDICINE

## 2019-09-25 PROCEDURE — 1036F TOBACCO NON-USER: CPT | Performed by: INTERNAL MEDICINE

## 2019-09-25 PROCEDURE — 99214 OFFICE O/P EST MOD 30 MIN: CPT | Performed by: INTERNAL MEDICINE

## 2019-09-25 PROCEDURE — 3017F COLORECTAL CA SCREEN DOC REV: CPT | Performed by: INTERNAL MEDICINE

## 2019-09-25 PROCEDURE — G8417 CALC BMI ABV UP PARAM F/U: HCPCS | Performed by: INTERNAL MEDICINE

## 2019-09-25 PROCEDURE — G8399 PT W/DXA RESULTS DOCUMENT: HCPCS | Performed by: INTERNAL MEDICINE

## 2019-09-25 PROCEDURE — 1090F PRES/ABSN URINE INCON ASSESS: CPT | Performed by: INTERNAL MEDICINE

## 2019-09-25 PROCEDURE — 4040F PNEUMOC VAC/ADMIN/RCVD: CPT | Performed by: INTERNAL MEDICINE

## 2019-09-25 PROCEDURE — 90662 IIV NO PRSV INCREASED AG IM: CPT | Performed by: INTERNAL MEDICINE

## 2019-09-25 PROCEDURE — 3045F PR MOST RECENT HEMOGLOBIN A1C LEVEL 7.0-9.0%: CPT | Performed by: INTERNAL MEDICINE

## 2019-09-25 PROCEDURE — G0008 ADMIN INFLUENZA VIRUS VAC: HCPCS | Performed by: INTERNAL MEDICINE

## 2019-09-25 PROCEDURE — 1123F ACP DISCUSS/DSCN MKR DOCD: CPT | Performed by: INTERNAL MEDICINE

## 2019-09-25 PROCEDURE — G8427 DOCREV CUR MEDS BY ELIG CLIN: HCPCS | Performed by: INTERNAL MEDICINE

## 2019-09-25 RX ORDER — GABAPENTIN 100 MG/1
100 CAPSULE ORAL NIGHTLY
Qty: 90 CAPSULE | Refills: 3 | Status: SHIPPED | OUTPATIENT
Start: 2019-09-25 | End: 2020-06-17

## 2019-09-25 RX ORDER — ATORVASTATIN CALCIUM 40 MG/1
40 TABLET, FILM COATED ORAL DAILY
Qty: 30 TABLET | Refills: 11 | Status: ON HOLD | OUTPATIENT
Start: 2019-09-25 | End: 2020-06-16 | Stop reason: HOSPADM

## 2019-09-25 RX ORDER — ANASTROZOLE 1 MG/1
1 TABLET ORAL DAILY
Qty: 90 TABLET | Refills: 3 | Status: SHIPPED | OUTPATIENT
Start: 2019-09-25 | End: 2020-06-17 | Stop reason: ALTCHOICE

## 2019-09-25 RX ORDER — POTASSIUM CHLORIDE 20 MEQ/1
20 TABLET, EXTENDED RELEASE ORAL 2 TIMES DAILY
Qty: 60 TABLET | Refills: 11 | Status: ON HOLD | OUTPATIENT
Start: 2019-09-25 | End: 2020-06-16 | Stop reason: HOSPADM

## 2019-09-25 RX ORDER — OMEPRAZOLE 20 MG/1
20 CAPSULE, DELAYED RELEASE ORAL DAILY
Qty: 90 CAPSULE | Refills: 1 | Status: SHIPPED | OUTPATIENT
Start: 2019-09-25 | End: 2020-06-08 | Stop reason: SDUPTHER

## 2019-09-25 RX ORDER — FUROSEMIDE 20 MG/1
20 TABLET ORAL DAILY
Qty: 30 TABLET | Refills: 11 | Status: ON HOLD | OUTPATIENT
Start: 2019-09-25 | End: 2020-06-16 | Stop reason: HOSPADM

## 2019-09-25 RX ORDER — LOSARTAN POTASSIUM 50 MG/1
50 TABLET ORAL DAILY
Qty: 90 TABLET | Refills: 3 | Status: ON HOLD | OUTPATIENT
Start: 2019-09-25 | End: 2020-06-16 | Stop reason: HOSPADM

## 2019-09-25 RX ORDER — TRAMADOL HYDROCHLORIDE 50 MG/1
50 TABLET ORAL EVERY 6 HOURS PRN
Qty: 60 TABLET | Refills: 0 | Status: SHIPPED | OUTPATIENT
Start: 2019-09-25 | End: 2019-12-23 | Stop reason: SDUPTHER

## 2019-09-25 RX ORDER — FERROUS SULFATE 325(65) MG
325 TABLET ORAL DAILY
Qty: 180 TABLET | Refills: 1 | Status: SHIPPED | OUTPATIENT
Start: 2019-09-25 | End: 2019-09-27 | Stop reason: ALTCHOICE

## 2019-09-25 RX ORDER — HYDROCHLOROTHIAZIDE 25 MG/1
TABLET ORAL
Qty: 30 TABLET | Refills: 11 | Status: ON HOLD | OUTPATIENT
Start: 2019-09-25 | End: 2020-06-16 | Stop reason: HOSPADM

## 2019-09-25 NOTE — PROGRESS NOTES
long-term current use of insulin ([facility]    5. Hypercholesterolemia        PLAN:    Ignacio Yusuf was seen today for hyperlipidemia. Diagnoses and all orders for this visit:    Essential hypertension-blood pressure at goal.  No change, check labs  -     Comprehensive Metabolic Panel; Future  -     Lipid Panel; Future  -     CBC Auto Differential; Future    Radiculopathy, lumbar region-continue tramadol and Celebrex. Patient also on gabapentin. Will stop Cymbalta at her request  -     CELEBREX 100 MG capsule; Take 1 capsule by mouth 2 times daily  -     traMADol (ULTRAM) 50 MG tablet; Take 1 tablet by mouth every 6 hours as needed for Pain for up to 30 days. Generalized osteoarthritis  -     CELEBREX 100 MG capsule; Take 1 capsule by mouth 2 times daily    Controlled type 2 diabetes mellitus without complication, without long-term current use of insulin ([facility])-check labs. Foot exam negative. -     Comprehensive Metabolic Panel; Future  -     Lipid Panel; Future  -     Hemoglobin A1C; Future  -     HM DIABETES FOOT EXAM  -     Microalbumin / Creatinine Urine Ratio; Future    Hypercholesterolemia-continue atorvastatin, check labs  -     Comprehensive Metabolic Panel; Future  -     Lipid Panel; Future    Other orders  -     atorvastatin (LIPITOR) 40 MG tablet; Take 1 tablet by mouth daily  -     losartan (COZAAR) 50 MG tablet; Take 1 tablet by mouth daily  -     hydrochlorothiazide (HYDRODIURIL) 25 MG tablet; TAKE 1 TABLET BY MOUTH EVERY DAY  -     furosemide (LASIX) 20 MG tablet; Take 1 tablet by mouth daily  -     gabapentin (NEURONTIN) 100 MG capsule; Take 1 capsule by mouth nightly for 360 days. -     omeprazole (PRILOSEC) 20 MG delayed release capsule; Take 1 capsule by mouth daily  -     potassium chloride (KLOR-CON M20) 20 MEQ extended release tablet; Take 1 tablet by mouth 2 times daily  -     anastrozole (ARIMIDEX) 1 MG tablet; Take 1 tablet by mouth daily  -     ferrous sulfate 325 (65 Fe) MG tablet;  Take 1 tablet by mouth daily  -     INFLUENZA, HIGH DOSE, 65 YRS +, IM, PF, PREFILL SYR, 0.5ML (FLUZONE HD)

## 2019-09-26 LAB
ESTIMATED AVERAGE GLUCOSE: 159.9 MG/DL
HBA1C MFR BLD: 7.2 %

## 2019-10-09 RX ORDER — ANASTROZOLE 1 MG/1
TABLET ORAL
Qty: 90 TABLET | Refills: 3 | Status: SHIPPED | OUTPATIENT
Start: 2019-10-09 | End: 2020-06-17 | Stop reason: ALTCHOICE

## 2019-11-01 RX ORDER — POTASSIUM CHLORIDE 1500 MG/1
20 TABLET, EXTENDED RELEASE ORAL 2 TIMES DAILY
Qty: 180 TABLET | Refills: 3 | Status: ON HOLD | OUTPATIENT
Start: 2019-11-01 | End: 2020-06-16 | Stop reason: HOSPADM

## 2019-12-23 ENCOUNTER — OFFICE VISIT (OUTPATIENT)
Dept: INTERNAL MEDICINE CLINIC | Age: 69
End: 2019-12-23
Payer: MEDICARE

## 2019-12-23 VITALS
SYSTOLIC BLOOD PRESSURE: 122 MMHG | OXYGEN SATURATION: 97 % | HEART RATE: 92 BPM | DIASTOLIC BLOOD PRESSURE: 72 MMHG | RESPIRATION RATE: 16 BRPM

## 2019-12-23 DIAGNOSIS — M54.16 RADICULOPATHY, LUMBAR REGION: ICD-10-CM

## 2019-12-23 DIAGNOSIS — M25.541 ARTHRALGIA OF RIGHT HAND: Primary | ICD-10-CM

## 2019-12-23 DIAGNOSIS — E11.9 CONTROLLED TYPE 2 DIABETES MELLITUS WITHOUT COMPLICATION, WITHOUT LONG-TERM CURRENT USE OF INSULIN (HCC): ICD-10-CM

## 2019-12-23 PROCEDURE — 3017F COLORECTAL CA SCREEN DOC REV: CPT | Performed by: INTERNAL MEDICINE

## 2019-12-23 PROCEDURE — 4040F PNEUMOC VAC/ADMIN/RCVD: CPT | Performed by: INTERNAL MEDICINE

## 2019-12-23 PROCEDURE — 1036F TOBACCO NON-USER: CPT | Performed by: INTERNAL MEDICINE

## 2019-12-23 PROCEDURE — 2022F DILAT RTA XM EVC RTNOPTHY: CPT | Performed by: INTERNAL MEDICINE

## 2019-12-23 PROCEDURE — 99213 OFFICE O/P EST LOW 20 MIN: CPT | Performed by: INTERNAL MEDICINE

## 2019-12-23 PROCEDURE — G8417 CALC BMI ABV UP PARAM F/U: HCPCS | Performed by: INTERNAL MEDICINE

## 2019-12-23 PROCEDURE — 1090F PRES/ABSN URINE INCON ASSESS: CPT | Performed by: INTERNAL MEDICINE

## 2019-12-23 PROCEDURE — G8399 PT W/DXA RESULTS DOCUMENT: HCPCS | Performed by: INTERNAL MEDICINE

## 2019-12-23 PROCEDURE — G8427 DOCREV CUR MEDS BY ELIG CLIN: HCPCS | Performed by: INTERNAL MEDICINE

## 2019-12-23 PROCEDURE — 3045F PR MOST RECENT HEMOGLOBIN A1C LEVEL 7.0-9.0%: CPT | Performed by: INTERNAL MEDICINE

## 2019-12-23 PROCEDURE — G8482 FLU IMMUNIZE ORDER/ADMIN: HCPCS | Performed by: INTERNAL MEDICINE

## 2019-12-23 PROCEDURE — 1123F ACP DISCUSS/DSCN MKR DOCD: CPT | Performed by: INTERNAL MEDICINE

## 2019-12-23 RX ORDER — TRAMADOL HYDROCHLORIDE 50 MG/1
50 TABLET ORAL EVERY 6 HOURS PRN
Qty: 60 TABLET | Refills: 0 | Status: SHIPPED | OUTPATIENT
Start: 2019-12-23 | End: 2020-01-10 | Stop reason: SDUPTHER

## 2019-12-24 LAB
A/G RATIO: 1.3 (CALC) (ref 0.8–2.6)
ALBUMIN SERPL-MCNC: 4.3 GM/DL (ref 3.5–5.2)
ALP BLD-CCNC: 109 U/L (ref 23–144)
ALT SERPL-CCNC: 26 U/L (ref 0–60)
AST SERPL-CCNC: 25 U/L (ref 0–55)
BILIRUB SERPL-MCNC: 0.5 MG/DL (ref 0–1.2)
BUN / CREAT RATIO: 18 (CALC) (ref 7–25)
BUN BLDV-MCNC: 9 MG/DL (ref 3–29)
C-REACTIVE PROTEIN: 0.7 MG/DL
CALCIUM SERPL-MCNC: 9.5 MG/DL (ref 8.5–10.5)
CHLORIDE BLD-SCNC: 103 MEQ/L (ref 96–110)
CO2: 26 MEQ/L (ref 19–32)
CREAT SERPL-MCNC: 0.5 MG/DL
GFR SERPL CREATININE-BSD FRML MDRD: 99 ML/MIN/1.73M2
GLOBULIN: 3.3 GM/DL (CALC) (ref 1.9–3.6)
GLUCOSE BLD-MCNC: 98 MG/DL
HBA1C MFR BLD: 7.2 % TOTAL HGB
POTASSIUM SERPL-SCNC: 4.2 MEQ/L (ref 3.4–5.3)
RHEUMATOID FACTOR: <10 IU/ML
SEDIMENTATION RATE, ERYTHROCYTE: 37 MM/HR (ref 0–30)
SODIUM BLD-SCNC: 141 MEQ/L (ref 135–148)
TOTAL PROTEIN: 7.6 GM/DL (ref 6–8.3)

## 2019-12-26 LAB
ANA PATTERN: ABNORMAL
ANTINUCLEAR ANTIBODIES (ANA): ABNORMAL TITER

## 2019-12-27 DIAGNOSIS — R76.8 POSITIVE ANA (ANTINUCLEAR ANTIBODY): Primary | ICD-10-CM

## 2019-12-27 LAB — CCP IGG ANTIBODIES: <20 UNITS

## 2020-01-07 RX ORDER — MOMETASONE FUROATE 50 UG/1
SPRAY, METERED NASAL DAILY
Qty: 1 INHALER | Refills: 11 | Status: SHIPPED | OUTPATIENT
Start: 2020-01-07 | End: 2020-01-07 | Stop reason: SDUPTHER

## 2020-01-07 RX ORDER — MOMETASONE FUROATE 50 UG/1
SPRAY, METERED NASAL DAILY
Qty: 1 INHALER | Refills: 11 | Status: SHIPPED | OUTPATIENT
Start: 2020-01-07 | End: 2020-01-15 | Stop reason: SDUPTHER

## 2020-01-10 RX ORDER — TRAMADOL HYDROCHLORIDE 50 MG/1
50 TABLET ORAL EVERY 6 HOURS PRN
Qty: 60 TABLET | Refills: 0 | Status: SHIPPED | OUTPATIENT
Start: 2020-01-10 | End: 2020-03-11 | Stop reason: SDUPTHER

## 2020-01-15 RX ORDER — MOMETASONE FUROATE 50 UG/1
2 SPRAY, METERED NASAL DAILY
Qty: 3 INHALER | Refills: 3 | Status: SHIPPED | OUTPATIENT
Start: 2020-01-15 | End: 2020-06-17 | Stop reason: ALTCHOICE

## 2020-03-11 RX ORDER — TRAMADOL HYDROCHLORIDE 50 MG/1
50 TABLET ORAL EVERY 6 HOURS PRN
Qty: 180 TABLET | Refills: 0 | Status: SHIPPED | OUTPATIENT
Start: 2020-03-11 | End: 2020-06-09

## 2020-03-11 NOTE — TELEPHONE ENCOUNTER
[facility].     Last OV 12/23/19    Next OV 3/23/20

## 2020-04-24 ENCOUNTER — TELEMEDICINE (OUTPATIENT)
Dept: INTERNAL MEDICINE CLINIC | Age: 70
End: 2020-04-24
Payer: MEDICARE

## 2020-04-24 PROCEDURE — 1036F TOBACCO NON-USER: CPT | Performed by: INTERNAL MEDICINE

## 2020-04-24 PROCEDURE — G8399 PT W/DXA RESULTS DOCUMENT: HCPCS | Performed by: INTERNAL MEDICINE

## 2020-04-24 PROCEDURE — 99214 OFFICE O/P EST MOD 30 MIN: CPT | Performed by: INTERNAL MEDICINE

## 2020-04-24 PROCEDURE — 1090F PRES/ABSN URINE INCON ASSESS: CPT | Performed by: INTERNAL MEDICINE

## 2020-04-24 PROCEDURE — 3017F COLORECTAL CA SCREEN DOC REV: CPT | Performed by: INTERNAL MEDICINE

## 2020-04-24 PROCEDURE — 4040F PNEUMOC VAC/ADMIN/RCVD: CPT | Performed by: INTERNAL MEDICINE

## 2020-04-24 PROCEDURE — 1123F ACP DISCUSS/DSCN MKR DOCD: CPT | Performed by: INTERNAL MEDICINE

## 2020-04-24 PROCEDURE — G8427 DOCREV CUR MEDS BY ELIG CLIN: HCPCS | Performed by: INTERNAL MEDICINE

## 2020-04-24 PROCEDURE — 2022F DILAT RTA XM EVC RTNOPTHY: CPT | Performed by: INTERNAL MEDICINE

## 2020-04-24 PROCEDURE — G8417 CALC BMI ABV UP PARAM F/U: HCPCS | Performed by: INTERNAL MEDICINE

## 2020-04-24 PROCEDURE — 3046F HEMOGLOBIN A1C LEVEL >9.0%: CPT | Performed by: INTERNAL MEDICINE

## 2020-04-24 RX ORDER — TIZANIDINE 2 MG/1
2 TABLET ORAL 4 TIMES DAILY PRN
Qty: 120 TABLET | Refills: 1 | Status: ON HOLD | OUTPATIENT
Start: 2020-04-24 | End: 2020-06-16 | Stop reason: HOSPADM

## 2020-04-24 RX ORDER — EXEMESTANE 25 MG/1
25 TABLET ORAL DAILY
COMMUNITY
Start: 2020-01-27 | End: 2020-06-17 | Stop reason: ALTCHOICE

## 2020-04-24 NOTE — PROGRESS NOTES
(LASIX) 20 MG tablet Take 1 tablet by mouth daily Yes Arlen Bassett MD   gabapentin (NEURONTIN) 100 MG capsule Take 1 capsule by mouth nightly for 360 days.  Yes Arlen Bassett MD   omeprazole (PRILOSEC) 20 MG delayed release capsule Take 1 capsule by mouth daily Yes Arlen Bassett MD   potassium chloride (KLOR-CON M20) 20 MEQ extended release tablet Take 1 tablet by mouth 2 times daily Yes Arlen Bassett MD   FLOVENT [facility] 110 MCG/ACT inhaler TAKE 2 PUFFS BY MOUTH TWICE A DAY Yes Arlen Bassett MD   albuterol sulfate  (90 Base) MCG/ACT inhaler Inhale 2 puffs into the lungs every 6 hours as needed for Wheezing or Shortness of Breath Yes Arlen Bassett MD   magnesium 30 MG tablet Take 50 mg by mouth 2 times daily Yes Historical Provider, MD   vitamin E 1000 units capsule Take 1,000 Units by mouth daily Yes Historical Provider, MD   aspirin (ASPIRIN CHILDRENS) 81 MG chewable tablet Take 1 tablet by mouth daily Yes Arlen Bassett MD   UNABLE TO FIND Please administer two SHINGRIX vaccines 2-6 months apart Yes Arlen Bassett MD   albuterol (ACCUNEB) 1.25 MG/3ML nebulizer solution Inhale 3 mLs into the lungs every 6 hours as needed for Wheezing Yes Arlen Bassett MD   Cholecalciferol (VITAMIN D3) 1000 UNITS TABS Take 2 tablets by mouth daily Yes Arlen Bassett MD   anastrozole (ARIMIDEX) 1 MG tablet TAKE 1 TABLET BY MOUTH EVERY DAY  Arlen Bassett MD   anastrozole (ARIMIDEX) 1 MG tablet Take 1 tablet by mouth daily  Arlen Bassett MD       Social History     Tobacco Use    Smoking status: Former Smoker     Packs/day: 2.00     Years: 16.00     Pack years: 32.00     Types: Cigarettes     Start date:      Last attempt to quit:      Years since quittin.3    Smokeless tobacco: Never Used    Tobacco comment: quit    Substance Use Topics    Alcohol use: Yes     Comment: Occasional    Drug use:

## 2020-05-08 ENCOUNTER — OFFICE VISIT (OUTPATIENT)
Dept: INTERNAL MEDICINE CLINIC | Age: 70
End: 2020-05-08
Payer: MEDICARE

## 2020-05-08 ENCOUNTER — PATIENT MESSAGE (OUTPATIENT)
Dept: INTERNAL MEDICINE CLINIC | Age: 70
End: 2020-05-08

## 2020-05-08 VITALS
HEART RATE: 91 BPM | OXYGEN SATURATION: 97 % | SYSTOLIC BLOOD PRESSURE: 138 MMHG | DIASTOLIC BLOOD PRESSURE: 76 MMHG | RESPIRATION RATE: 18 BRPM

## 2020-05-08 PROCEDURE — 3017F COLORECTAL CA SCREEN DOC REV: CPT | Performed by: INTERNAL MEDICINE

## 2020-05-08 PROCEDURE — G8417 CALC BMI ABV UP PARAM F/U: HCPCS | Performed by: INTERNAL MEDICINE

## 2020-05-08 PROCEDURE — 99214 OFFICE O/P EST MOD 30 MIN: CPT | Performed by: INTERNAL MEDICINE

## 2020-05-08 PROCEDURE — 1090F PRES/ABSN URINE INCON ASSESS: CPT | Performed by: INTERNAL MEDICINE

## 2020-05-08 PROCEDURE — 4040F PNEUMOC VAC/ADMIN/RCVD: CPT | Performed by: INTERNAL MEDICINE

## 2020-05-08 PROCEDURE — G8427 DOCREV CUR MEDS BY ELIG CLIN: HCPCS | Performed by: INTERNAL MEDICINE

## 2020-05-08 PROCEDURE — 1036F TOBACCO NON-USER: CPT | Performed by: INTERNAL MEDICINE

## 2020-05-08 PROCEDURE — 1123F ACP DISCUSS/DSCN MKR DOCD: CPT | Performed by: INTERNAL MEDICINE

## 2020-05-08 PROCEDURE — G8399 PT W/DXA RESULTS DOCUMENT: HCPCS | Performed by: INTERNAL MEDICINE

## 2020-05-08 RX ORDER — HYDROCODONE BITARTRATE AND ACETAMINOPHEN 5; 325 MG/1; MG/1
1 TABLET ORAL EVERY 6 HOURS PRN
Qty: 28 TABLET | Refills: 0 | Status: SHIPPED | OUTPATIENT
Start: 2020-05-08 | End: 2020-05-18 | Stop reason: SDUPTHER

## 2020-05-08 ASSESSMENT — PATIENT HEALTH QUESTIONNAIRE - PHQ9
SUM OF ALL RESPONSES TO PHQ QUESTIONS 1-9: 0
1. LITTLE INTEREST OR PLEASURE IN DOING THINGS: 0
SUM OF ALL RESPONSES TO PHQ9 QUESTIONS 1 & 2: 0
SUM OF ALL RESPONSES TO PHQ QUESTIONS 1-9: 0
2. FEELING DOWN, DEPRESSED OR HOPELESS: 0

## 2020-05-08 NOTE — PROGRESS NOTES
time.   Psychiatric:         Behavior: Behavior normal.         Judgment: Judgment normal.     Diffuse pain in thoracic and lumbar paraspinal muscles    ASSESSMENT:    1. Generalized abdominal pain    2. Nausea    3. Myalgia    4. Arthralgia, unspecified joint        PLAN:    Daisy Johnson was seen today for back pain and nausea. Diagnoses and all orders for this visit:    Generalized abdominal pain - I do not have a great explanation for the pts diffuse symptoms. Recent labs were OK, but I will check for parvo and mono, also CPK. I will check a CT of the abdomen given the diffuse pain and the possibility of mets from the breast cancer - the Cwill give us some view of the spine as well as the viscera. I will have her stop the atorvastatin and anastrozole, both of which could be contributing to her symptoms. I will increase her pain meds. We will f/u in one month, but sooner depending on results of tests and her symptoms  -     Mononucleosis Screen; Future  -     PARVOVIRUS B19 ANTIBODY, IGG AND IGM; Future  -     CT ABDOMEN PELVIS W WO CONTRAST Additional Contrast? Oral; Future  -     CK; Future  -     HYDROcodone-acetaminophen (NORCO) 5-325 MG per tablet; Take 1 tablet by mouth every 6 hours as needed for Pain for up to 7 days. Intended supply: 7 days. Take lowest dose possible to manage pain    Nausea  -     Mononucleosis Screen; Future  -     PARVOVIRUS B19 ANTIBODY, IGG AND IGM; Future  -     CT ABDOMEN PELVIS W WO CONTRAST Additional Contrast? Oral; Future  -     CK; Future  -     HYDROcodone-acetaminophen (NORCO) 5-325 MG per tablet; Take 1 tablet by mouth every 6 hours as needed for Pain for up to 7 days. Intended supply: 7 days. Take lowest dose possible to manage pain    Myalgia  -     Mononucleosis Screen; Future  -     PARVOVIRUS B19 ANTIBODY, IGG AND IGM; Future  -     CT ABDOMEN PELVIS W WO CONTRAST Additional Contrast? Oral; Future  -     CK;  Future  -     HYDROcodone-acetaminophen (NORCO) 5-325 MG per tablet; Take 1 tablet by mouth every 6 hours as needed for Pain for up to 7 days. Intended supply: 7 days. Take lowest dose possible to manage pain    Arthralgia, unspecified joint  -     Mononucleosis Screen; Future  -     PARVOVIRUS B19 ANTIBODY, IGG AND IGM; Future  -     CT ABDOMEN PELVIS W WO CONTRAST Additional Contrast? Oral; Future  -     CK; Future  -     HYDROcodone-acetaminophen (NORCO) 5-325 MG per tablet; Take 1 tablet by mouth every 6 hours as needed for Pain for up to 7 days. Intended supply: 7 days.  Take lowest dose possible to manage pain

## 2020-05-10 LAB
HETEROPHILE ANTIBODIES: POSITIVE
TOTAL CK: 41 U/L (ref 0–200)

## 2020-05-11 RX ORDER — ONDANSETRON 4 MG/1
4 TABLET, FILM COATED ORAL 3 TIMES DAILY PRN
Qty: 30 TABLET | Refills: 0 | Status: ON HOLD | OUTPATIENT
Start: 2020-05-11 | End: 2020-06-16 | Stop reason: SDUPTHER

## 2020-05-12 ENCOUNTER — HOSPITAL ENCOUNTER (OUTPATIENT)
Dept: CT IMAGING | Age: 70
Discharge: HOME | End: 2020-05-12
Payer: MEDICARE

## 2020-05-12 PROCEDURE — 2580000003 HC RX 258: Performed by: INTERNAL MEDICINE

## 2020-05-12 PROCEDURE — 74177 CT ABD & PELVIS W/CONTRAST: CPT

## 2020-05-12 PROCEDURE — 6360000004 HC RX CONTRAST MEDICATION: Performed by: INTERNAL MEDICINE

## 2020-05-12 RX ORDER — SODIUM CHLORIDE 0.9 % (FLUSH) 0.9 %
10 SYRINGE (ML) INJECTION ONCE
Status: COMPLETED | OUTPATIENT
Start: 2020-05-12 | End: 2020-05-12

## 2020-05-12 RX ADMIN — IOHEXOL 50 ML: 240 INJECTION, SOLUTION INTRATHECAL; INTRAVASCULAR; INTRAVENOUS; ORAL at 11:09

## 2020-05-12 RX ADMIN — IOPAMIDOL 75 ML: 755 INJECTION, SOLUTION INTRAVENOUS at 11:09

## 2020-05-12 RX ADMIN — SODIUM CHLORIDE, PRESERVATIVE FREE 10 ML: 5 INJECTION INTRAVENOUS at 11:09

## 2020-05-18 RX ORDER — HYDROCODONE BITARTRATE AND ACETAMINOPHEN 5; 325 MG/1; MG/1
1 TABLET ORAL EVERY 6 HOURS PRN
Qty: 28 TABLET | Refills: 0 | Status: SHIPPED | OUTPATIENT
Start: 2020-05-18 | End: 2020-05-25

## 2020-05-26 ENCOUNTER — TELEPHONE (OUTPATIENT)
Dept: INTERNAL MEDICINE CLINIC | Age: 70
End: 2020-05-26

## 2020-05-27 ENCOUNTER — OFFICE VISIT (OUTPATIENT)
Dept: INTERNAL MEDICINE CLINIC | Age: 70
End: 2020-05-27
Payer: MEDICARE

## 2020-05-27 VITALS
BODY MASS INDEX: 43.16 KG/M2 | WEIGHT: 236 LBS | OXYGEN SATURATION: 95 % | DIASTOLIC BLOOD PRESSURE: 70 MMHG | RESPIRATION RATE: 18 BRPM | SYSTOLIC BLOOD PRESSURE: 138 MMHG | HEART RATE: 99 BPM

## 2020-05-27 PROCEDURE — 1090F PRES/ABSN URINE INCON ASSESS: CPT | Performed by: INTERNAL MEDICINE

## 2020-05-27 PROCEDURE — 3017F COLORECTAL CA SCREEN DOC REV: CPT | Performed by: INTERNAL MEDICINE

## 2020-05-27 PROCEDURE — 4040F PNEUMOC VAC/ADMIN/RCVD: CPT | Performed by: INTERNAL MEDICINE

## 2020-05-27 PROCEDURE — G8417 CALC BMI ABV UP PARAM F/U: HCPCS | Performed by: INTERNAL MEDICINE

## 2020-05-27 PROCEDURE — 1036F TOBACCO NON-USER: CPT | Performed by: INTERNAL MEDICINE

## 2020-05-27 PROCEDURE — 99213 OFFICE O/P EST LOW 20 MIN: CPT | Performed by: INTERNAL MEDICINE

## 2020-05-27 PROCEDURE — G8427 DOCREV CUR MEDS BY ELIG CLIN: HCPCS | Performed by: INTERNAL MEDICINE

## 2020-05-27 PROCEDURE — G8399 PT W/DXA RESULTS DOCUMENT: HCPCS | Performed by: INTERNAL MEDICINE

## 2020-05-27 PROCEDURE — 1123F ACP DISCUSS/DSCN MKR DOCD: CPT | Performed by: INTERNAL MEDICINE

## 2020-05-29 LAB
A/G RATIO: 1.5 (CALC) (ref 0.8–2.6)
ALBUMIN SERPL-MCNC: 4.3 GM/DL (ref 3.5–5.2)
ALP BLD-CCNC: 217 U/L (ref 23–144)
ALT SERPL-CCNC: 65 U/L (ref 0–60)
AST SERPL-CCNC: 48 U/L (ref 0–55)
BASOPHILS ABSOLUTE: 0.1 K/MM3 (ref 0–0.3)
BASOPHILS RELATIVE PERCENT: 1 % (ref 0–2)
BILIRUB SERPL-MCNC: 2 MG/DL (ref 0–1.2)
BUN / CREAT RATIO: 16 (CALC) (ref 7–25)
BUN BLDV-MCNC: 21 MG/DL (ref 3–29)
CALCIUM SERPL-MCNC: 9.1 MG/DL (ref 8.5–10.5)
CHLORIDE BLD-SCNC: 94 MEQ/L (ref 96–110)
CO2: 24 MEQ/L (ref 19–32)
CREAT SERPL-MCNC: 1.3 MG/DL
EOSINOPHILS ABSOLUTE: 0.1 K/MM3 (ref 0–0.6)
EOSINOPHILS RELATIVE PERCENT: 1.4 % (ref 0–7)
GFR SERPL CREATININE-BSD FRML MDRD: 42 ML/MIN/1.73M2
GLOBULIN: 2.9 GM/DL (CALC) (ref 1.9–3.6)
GLUCOSE BLD-MCNC: 109 MG/DL
HCT VFR BLD CALC: 42.2 % (ref 35–46)
HEMOGLOBIN: 14.3 G/DL (ref 12–15.6)
LEUKOCYTES, UA: 8.6 K/MM3 (ref 3.8–10.8)
LIPASE: 73 U/L (ref 0–60)
LYMPHOCYTES ABSOLUTE: 1.1 K/MM3 (ref 0.9–4.1)
LYMPHOCYTES RELATIVE PERCENT: 12.8 % (ref 14–51)
MCH RBC QN AUTO: 32.2 PG (ref 27–33)
MCHC RBC AUTO-ENTMCNC: 34 G/DL (ref 32–36)
MCV RBC AUTO: 94.8 FL (ref 80–100)
MONOCYTES ABSOLUTE: 0.7 K/MM3 (ref 0.2–1.1)
MONOCYTES RELATIVE PERCENT: 7.9 % (ref 0–14)
NEUTROPHILS ABSOLUTE: 6.6 K/MM3 (ref 1.5–7.8)
PDW BLD-RTO: 14.6 % (ref 9–15)
PLATELET # BLD: 272 K/MM3 (ref 130–400)
POTASSIUM SERPL-SCNC: 4.1 MEQ/L (ref 3.4–5.3)
RBC # BLD: 4.45 M/MM3 (ref 3.9–5.2)
SEGMENTED NEUTROPHILS RELATIVE PERCENT: 76.9 % (ref 40–76)
SODIUM BLD-SCNC: 134 MEQ/L (ref 135–148)
TOTAL PROTEIN: 7.2 GM/DL (ref 6–8.3)

## 2020-06-01 ENCOUNTER — HOSPITAL ENCOUNTER (OUTPATIENT)
Dept: ULTRASOUND IMAGING | Age: 70
Discharge: HOME | End: 2020-06-01
Payer: MEDICARE

## 2020-06-01 PROCEDURE — 76705 ECHO EXAM OF ABDOMEN: CPT

## 2020-06-03 ENCOUNTER — PATIENT MESSAGE (OUTPATIENT)
Dept: INTERNAL MEDICINE CLINIC | Age: 70
End: 2020-06-03

## 2020-06-03 ENCOUNTER — TELEPHONE (OUTPATIENT)
Dept: INTERNAL MEDICINE CLINIC | Age: 70
End: 2020-06-03

## 2020-06-03 NOTE — TELEPHONE ENCOUNTER
Contacted the pt to advised her of her MRI Abdomen MRCP it is scheduled for Thursday June 11th @ 9 @ [facility]. Verbal ok from the pt.

## 2020-06-04 ENCOUNTER — PREP FOR PROCEDURE (OUTPATIENT)
Dept: GASTROENTEROLOGY | Age: 70
End: 2020-06-04

## 2020-06-05 ENCOUNTER — HOSPITAL ENCOUNTER (OUTPATIENT)
Dept: MRI IMAGING | Age: 70
Discharge: HOME | End: 2020-06-05
Payer: MEDICARE

## 2020-06-05 PROCEDURE — 74183 MRI ABD W/O CNTR FLWD CNTR: CPT

## 2020-06-05 PROCEDURE — A9579 GAD-BASE MR CONTRAST NOS,1ML: HCPCS | Performed by: INTERNAL MEDICINE

## 2020-06-05 PROCEDURE — 6360000004 HC RX CONTRAST MEDICATION: Performed by: INTERNAL MEDICINE

## 2020-06-05 RX ADMIN — GADOTERIDOL 20 ML: 279.3 INJECTION, SOLUTION INTRAVENOUS at 11:37

## 2020-06-08 ENCOUNTER — TELEPHONE (OUTPATIENT)
Dept: INTERNAL MEDICINE CLINIC | Age: 70
End: 2020-06-08

## 2020-06-08 LAB
ALBUMIN SERPL-MCNC: 3.5 G/DL (ref 3.4–5)
ALP BLD-CCNC: 903 U/L (ref 40–129)
ALT SERPL-CCNC: 93 U/L (ref 10–40)
AST SERPL-CCNC: 119 U/L (ref 15–37)
BASOPHILS ABSOLUTE: 0.1 K/UL (ref 0–0.2)
BASOPHILS RELATIVE PERCENT: 1 %
BILIRUB SERPL-MCNC: 7.2 MG/DL (ref 0–1)
BILIRUBIN DIRECT: 5.6 MG/DL (ref 0–0.3)
BILIRUBIN, INDIRECT: 1.6 MG/DL (ref 0–1)
CEA: 4.4 NG/ML (ref 0–5)
EOSINOPHILS ABSOLUTE: 0.1 K/UL (ref 0–0.6)
EOSINOPHILS RELATIVE PERCENT: 1.7 %
HCT VFR BLD CALC: 35.6 % (ref 36–48)
HEMOGLOBIN: 11.8 G/DL (ref 12–16)
LYMPHOCYTES ABSOLUTE: 1.2 K/UL (ref 1–5.1)
LYMPHOCYTES RELATIVE PERCENT: 20 %
MCH RBC QN AUTO: 31.6 PG (ref 26–34)
MCHC RBC AUTO-ENTMCNC: 33.1 G/DL (ref 31–36)
MCV RBC AUTO: 95.4 FL (ref 80–100)
MONOCYTES ABSOLUTE: 0.9 K/UL (ref 0–1.3)
MONOCYTES RELATIVE PERCENT: 14.9 %
NEUTROPHILS ABSOLUTE: 3.8 K/UL (ref 1.7–7.7)
NEUTROPHILS RELATIVE PERCENT: 62.4 %
PDW BLD-RTO: 14.7 % (ref 12.4–15.4)
PLATELET # BLD: 226 K/UL (ref 135–450)
PMV BLD AUTO: 11 FL (ref 5–10.5)
RBC # BLD: 3.74 M/UL (ref 4–5.2)
TOTAL PROTEIN: 6.5 G/DL (ref 6.4–8.2)
WBC # BLD: 6 K/UL (ref 4–11)

## 2020-06-08 PROCEDURE — 36415 COLL VENOUS BLD VENIPUNCTURE: CPT | Performed by: INTERNAL MEDICINE

## 2020-06-08 RX ORDER — OMEPRAZOLE 20 MG/1
20 CAPSULE, DELAYED RELEASE ORAL DAILY
Qty: 90 CAPSULE | Refills: 1 | Status: SHIPPED | OUTPATIENT
Start: 2020-06-08

## 2020-06-09 ENCOUNTER — HOSPITAL ENCOUNTER (OUTPATIENT)
Age: 70
Setting detail: SPECIMEN
Discharge: HOME | End: 2020-06-09
Payer: MEDICARE

## 2020-06-09 LAB
APTT: 34.8 SECONDS (ref 25.1–37.1)
INR BLD: 1.38 INDEX
PROTHROMBIN TIME: 16.7 SECONDS (ref 11.7–14.5)

## 2020-06-09 PROCEDURE — 85610 PROTHROMBIN TIME: CPT

## 2020-06-09 PROCEDURE — 85730 THROMBOPLASTIN TIME PARTIAL: CPT

## 2020-06-10 ENCOUNTER — HOSPITAL ENCOUNTER (OUTPATIENT)
Age: 70
Discharge: HOME | DRG: 659 | End: 2020-06-10
Payer: MEDICARE

## 2020-06-10 LAB
IGG 1: 831 MG/DL (ref 240–1118)
IGG 2: 225 MG/DL (ref 124–549)
IGG 3: 88 MG/DL (ref 21–134)
IGG 4: 22 MG/DL (ref 1–123)

## 2020-06-10 PROCEDURE — U0002 COVID-19 LAB TEST NON-CDC: HCPCS

## 2020-06-11 LAB — CA 19-9: 37 U/ML (ref 0–35)

## 2020-06-12 ENCOUNTER — APPOINTMENT (OUTPATIENT)
Dept: CT IMAGING | Age: 70
DRG: 659 | End: 2020-06-12
Payer: MEDICARE

## 2020-06-12 ENCOUNTER — HOSPITAL ENCOUNTER (INPATIENT)
Age: 70
LOS: 3 days | Discharge: HOME | DRG: 659 | End: 2020-06-16
Attending: EMERGENCY MEDICINE | Admitting: INTERNAL MEDICINE
Payer: MEDICARE

## 2020-06-12 LAB
ALBUMIN SERPL-MCNC: 3.4 GM/DL (ref 3.4–5)
ALP BLD-CCNC: 1153 IU/L (ref 40–129)
ALT SERPL-CCNC: 132 U/L (ref 10–40)
ANION GAP SERPL CALCULATED.3IONS-SCNC: 20 MMOL/L (ref 4–16)
APTT: 36 SECONDS (ref 25.1–37.1)
AST SERPL-CCNC: 151 IU/L (ref 15–37)
BASOPHILS ABSOLUTE: 0.1 K/CU MM
BASOPHILS RELATIVE PERCENT: 1.2 % (ref 0–1)
BILIRUB SERPL-MCNC: 14.7 MG/DL (ref 0–1)
BUN BLDV-MCNC: 70 MG/DL (ref 6–23)
CALCIUM SERPL-MCNC: 9.3 MG/DL (ref 8.3–10.6)
CHLORIDE BLD-SCNC: 88 MMOL/L (ref 99–110)
CO2: 18 MMOL/L (ref 21–32)
CREAT SERPL-MCNC: 8.5 MG/DL (ref 0.6–1.1)
DIFFERENTIAL TYPE: ABNORMAL
EOSINOPHILS ABSOLUTE: 0.2 K/CU MM
EOSINOPHILS RELATIVE PERCENT: 2.2 % (ref 0–3)
GFR AFRICAN AMERICAN: 6 ML/MIN/1.73M2
GFR NON-AFRICAN AMERICAN: 5 ML/MIN/1.73M2
GLUCOSE BLD-MCNC: 83 MG/DL (ref 70–99)
HCT VFR BLD CALC: 33.4 % (ref 37–47)
HEMOGLOBIN: 11.9 GM/DL (ref 12.5–16)
IMMATURE NEUTROPHIL %: 0.9 % (ref 0–0.43)
INR BLD: 1.33 INDEX
LIPASE: 124 IU/L (ref 13–60)
LYMPHOCYTES ABSOLUTE: 1.3 K/CU MM
LYMPHOCYTES RELATIVE PERCENT: 18.9 % (ref 24–44)
MCH RBC QN AUTO: 31.4 PG (ref 27–31)
MCHC RBC AUTO-ENTMCNC: 35.6 % (ref 32–36)
MCV RBC AUTO: 88.1 FL (ref 78–100)
MONOCYTES ABSOLUTE: 0.9 K/CU MM
MONOCYTES RELATIVE PERCENT: 12.3 % (ref 0–4)
NUCLEATED RBC %: 0 %
PDW BLD-RTO: 16 % (ref 11.7–14.9)
PLATELET # BLD: 303 K/CU MM (ref 140–440)
PMV BLD AUTO: 12.3 FL (ref 7.5–11.1)
POTASSIUM SERPL-SCNC: 4.8 MMOL/L (ref 3.5–5.1)
PROTHROMBIN TIME: 16.2 SECONDS (ref 11.7–14.5)
RBC # BLD: 3.79 M/CU MM (ref 4.2–5.4)
SARS-COV-2: NOT DETECTED
SEGMENTED NEUTROPHILS ABSOLUTE COUNT: 4.5 K/CU MM
SEGMENTED NEUTROPHILS RELATIVE PERCENT: 64.5 % (ref 36–66)
SODIUM BLD-SCNC: 126 MMOL/L (ref 135–145)
SOURCE: NORMAL
TOTAL IMMATURE NEUTOROPHIL: 0.06 K/CU MM
TOTAL NUCLEATED RBC: 0 K/CU MM
TOTAL PROTEIN: 7.3 GM/DL (ref 6.4–8.2)
WBC # BLD: 6.9 K/CU MM (ref 4–10.5)

## 2020-06-12 PROCEDURE — 81001 URINALYSIS AUTO W/SCOPE: CPT

## 2020-06-12 PROCEDURE — 85025 COMPLETE CBC W/AUTO DIFF WBC: CPT

## 2020-06-12 PROCEDURE — 85610 PROTHROMBIN TIME: CPT

## 2020-06-12 PROCEDURE — 80053 COMPREHEN METABOLIC PANEL: CPT

## 2020-06-12 PROCEDURE — 74176 CT ABD & PELVIS W/O CONTRAST: CPT

## 2020-06-12 PROCEDURE — 83690 ASSAY OF LIPASE: CPT

## 2020-06-12 PROCEDURE — 99285 EMERGENCY DEPT VISIT HI MDM: CPT

## 2020-06-12 PROCEDURE — 85730 THROMBOPLASTIN TIME PARTIAL: CPT

## 2020-06-12 RX ORDER — MORPHINE SULFATE 4 MG/ML
2 INJECTION, SOLUTION INTRAMUSCULAR; INTRAVENOUS
Status: DISCONTINUED | OUTPATIENT
Start: 2020-06-12 | End: 2020-06-13 | Stop reason: ALTCHOICE

## 2020-06-12 RX ORDER — ONDANSETRON 2 MG/ML
4 INJECTION INTRAMUSCULAR; INTRAVENOUS EVERY 30 MIN PRN
Status: DISCONTINUED | OUTPATIENT
Start: 2020-06-12 | End: 2020-06-13 | Stop reason: SDUPTHER

## 2020-06-12 RX ORDER — PROPOFOL 10 MG/ML
10 INJECTION, EMULSION INTRAVENOUS ONCE
Status: DISCONTINUED | OUTPATIENT
Start: 2020-06-12 | End: 2020-06-12

## 2020-06-12 RX ORDER — SODIUM CHLORIDE 9 MG/ML
INJECTION, SOLUTION INTRAVENOUS CONTINUOUS
Status: DISCONTINUED | OUTPATIENT
Start: 2020-06-13 | End: 2020-06-13

## 2020-06-12 ASSESSMENT — PAIN SCALES - GENERAL: PAINLEVEL_OUTOF10: 7

## 2020-06-12 ASSESSMENT — PAIN DESCRIPTION - LOCATION: LOCATION: FLANK

## 2020-06-12 ASSESSMENT — PAIN DESCRIPTION - ORIENTATION: ORIENTATION: RIGHT

## 2020-06-12 ASSESSMENT — PAIN DESCRIPTION - PAIN TYPE: TYPE: ACUTE PAIN

## 2020-06-13 ENCOUNTER — APPOINTMENT (OUTPATIENT)
Dept: GENERAL RADIOLOGY | Age: 70
DRG: 659 | End: 2020-06-13
Payer: MEDICARE

## 2020-06-13 ENCOUNTER — ANESTHESIA (OUTPATIENT)
Dept: OPERATING ROOM | Age: 70
DRG: 659 | End: 2020-06-13
Payer: MEDICARE

## 2020-06-13 ENCOUNTER — ANESTHESIA EVENT (OUTPATIENT)
Dept: OPERATING ROOM | Age: 70
DRG: 659 | End: 2020-06-13
Payer: MEDICARE

## 2020-06-13 VITALS
TEMPERATURE: 98.2 F | OXYGEN SATURATION: 98 % | RESPIRATION RATE: 23 BRPM | SYSTOLIC BLOOD PRESSURE: 109 MMHG | DIASTOLIC BLOOD PRESSURE: 76 MMHG

## 2020-06-13 PROBLEM — N17.9 ACUTE RENAL FAILURE (ARF) (HCC): Status: ACTIVE | Noted: 2020-06-13

## 2020-06-13 LAB
ALBUMIN SERPL-MCNC: 2.9 GM/DL (ref 3.4–5)
AMMONIA: 56 UMOL/L (ref 11–51)
AMORPHOUS: ABNORMAL /HPF
ANION GAP SERPL CALCULATED.3IONS-SCNC: 21 MMOL/L (ref 4–16)
ANISOCYTOSIS: ABNORMAL
BACTERIA: ABNORMAL /HPF
BANDED NEUTROPHILS ABSOLUTE COUNT: 0.06 K/CU MM
BANDED NEUTROPHILS RELATIVE PERCENT: 1 % (ref 5–11)
BILIRUBIN URINE: ABNORMAL MG/DL
BLOOD, URINE: ABNORMAL
BUN BLDV-MCNC: 73 MG/DL (ref 6–23)
CALCIUM SERPL-MCNC: 8.6 MG/DL (ref 8.3–10.6)
CHLORIDE BLD-SCNC: 94 MMOL/L (ref 99–110)
CLARITY: CLEAR
CO2: 16 MMOL/L (ref 21–32)
COLOR: ABNORMAL
CREAT SERPL-MCNC: 8.8 MG/DL (ref 0.6–1.1)
DIFFERENTIAL TYPE: ABNORMAL
EOSINOPHILS ABSOLUTE: 0.2 K/CU MM
EOSINOPHILS RELATIVE PERCENT: 4 % (ref 0–3)
ESTIMATED AVERAGE GLUCOSE: 151 MG/DL
GFR AFRICAN AMERICAN: 5 ML/MIN/1.73M2
GFR NON-AFRICAN AMERICAN: 4 ML/MIN/1.73M2
GLUCOSE BLD-MCNC: 73 MG/DL (ref 70–99)
GLUCOSE BLD-MCNC: 82 MG/DL (ref 70–99)
GLUCOSE BLD-MCNC: 89 MG/DL (ref 70–99)
GLUCOSE BLD-MCNC: 93 MG/DL (ref 70–99)
GLUCOSE, URINE: NEGATIVE MG/DL
HBA1C MFR BLD: 6.9 % (ref 4.2–6.3)
HCT VFR BLD CALC: 29.8 % (ref 37–47)
HEMOGLOBIN: 9.9 GM/DL (ref 12.5–16)
HYALINE CASTS: 0 /LPF
ICTOTEST: POSITIVE
KETONES, URINE: NEGATIVE MG/DL
LEUKOCYTE ESTERASE, URINE: ABNORMAL
LYMPHOCYTES ABSOLUTE: 0.8 K/CU MM
LYMPHOCYTES RELATIVE PERCENT: 14 % (ref 24–44)
MCH RBC QN AUTO: 30.9 PG (ref 27–31)
MCHC RBC AUTO-ENTMCNC: 33.2 % (ref 32–36)
MCV RBC AUTO: 93.1 FL (ref 78–100)
MONOCYTES ABSOLUTE: 0.7 K/CU MM
MONOCYTES RELATIVE PERCENT: 12 % (ref 0–4)
MUCUS: ABNORMAL HPF
NITRITE URINE, QUANTITATIVE: NEGATIVE
NON SQUAM EPI CELLS: 1 /HPF
PDW BLD-RTO: 16.6 % (ref 11.7–14.9)
PH, URINE: 5 (ref 5–8)
PHOSPHORUS: 6.9 MG/DL (ref 2.5–4.9)
PLATELET # BLD: 231 K/CU MM (ref 140–440)
PLT MORPHOLOGY: ABNORMAL
PMV BLD AUTO: 12.2 FL (ref 7.5–11.1)
POTASSIUM SERPL-SCNC: 5.3 MMOL/L (ref 3.5–5.1)
PROTEIN UA: NEGATIVE MG/DL
RBC # BLD: 3.2 M/CU MM (ref 4.2–5.4)
RBC URINE: 16 /HPF (ref 0–6)
SEGMENTED NEUTROPHILS ABSOLUTE COUNT: 4 K/CU MM
SEGMENTED NEUTROPHILS RELATIVE PERCENT: 69 % (ref 36–66)
SODIUM BLD-SCNC: 131 MMOL/L (ref 135–145)
SPECIFIC GRAVITY UA: 1.01 (ref 1–1.03)
SQUAMOUS EPITHELIAL: 7 /HPF
TARGET CELLS: ABNORMAL
TRICHOMONAS: ABNORMAL /HPF
UROBILINOGEN, URINE: NORMAL MG/DL (ref 0.2–1)
WBC # BLD: 5.8 K/CU MM (ref 4–10.5)
WBC UA: 8 /HPF (ref 0–5)

## 2020-06-13 PROCEDURE — 96374 THER/PROPH/DIAG INJ IV PUSH: CPT

## 2020-06-13 PROCEDURE — 86255 FLUORESCENT ANTIBODY SCREEN: CPT

## 2020-06-13 PROCEDURE — 3700000001 HC ADD 15 MINUTES (ANESTHESIA): Performed by: UROLOGY

## 2020-06-13 PROCEDURE — 3600000003 HC SURGERY LEVEL 3 BASE: Performed by: UROLOGY

## 2020-06-13 PROCEDURE — 6360000002 HC RX W HCPCS: Performed by: UROLOGY

## 2020-06-13 PROCEDURE — 85027 COMPLETE CBC AUTOMATED: CPT

## 2020-06-13 PROCEDURE — 3600000013 HC SURGERY LEVEL 3 ADDTL 15MIN: Performed by: UROLOGY

## 2020-06-13 PROCEDURE — 3700000000 HC ANESTHESIA ATTENDED CARE: Performed by: UROLOGY

## 2020-06-13 PROCEDURE — 82140 ASSAY OF AMMONIA: CPT

## 2020-06-13 PROCEDURE — 2500000003 HC RX 250 WO HCPCS: Performed by: NURSE ANESTHETIST, CERTIFIED REGISTERED

## 2020-06-13 PROCEDURE — 6360000002 HC RX W HCPCS: Performed by: INTERNAL MEDICINE

## 2020-06-13 PROCEDURE — 2580000003 HC RX 258: Performed by: UROLOGY

## 2020-06-13 PROCEDURE — 1200000000 HC SEMI PRIVATE

## 2020-06-13 PROCEDURE — 82962 GLUCOSE BLOOD TEST: CPT

## 2020-06-13 PROCEDURE — 7100000001 HC PACU RECOVERY - ADDTL 15 MIN: Performed by: UROLOGY

## 2020-06-13 PROCEDURE — 6370000000 HC RX 637 (ALT 250 FOR IP): Performed by: UROLOGY

## 2020-06-13 PROCEDURE — 6370000000 HC RX 637 (ALT 250 FOR IP): Performed by: INTERNAL MEDICINE

## 2020-06-13 PROCEDURE — 2580000003 HC RX 258: Performed by: NURSE ANESTHETIST, CERTIFIED REGISTERED

## 2020-06-13 PROCEDURE — 6360000002 HC RX W HCPCS: Performed by: ANESTHESIOLOGY

## 2020-06-13 PROCEDURE — 2709999900 HC NON-CHARGEABLE SUPPLY: Performed by: UROLOGY

## 2020-06-13 PROCEDURE — 83036 HEMOGLOBIN GLYCOSYLATED A1C: CPT

## 2020-06-13 PROCEDURE — 6360000002 HC RX W HCPCS: Performed by: NURSE ANESTHETIST, CERTIFIED REGISTERED

## 2020-06-13 PROCEDURE — BT141ZZ FLUOROSCOPY OF KIDNEYS, URETERS AND BLADDER USING LOW OSMOLAR CONTRAST: ICD-10-PCS | Performed by: UROLOGY

## 2020-06-13 PROCEDURE — C2617 STENT, NON-COR, TEM W/O DEL: HCPCS | Performed by: UROLOGY

## 2020-06-13 PROCEDURE — 84165 PROTEIN E-PHORESIS SERUM: CPT

## 2020-06-13 PROCEDURE — 87086 URINE CULTURE/COLONY COUNT: CPT

## 2020-06-13 PROCEDURE — 7100000000 HC PACU RECOVERY - FIRST 15 MIN: Performed by: UROLOGY

## 2020-06-13 PROCEDURE — 0T788DZ DILATION OF BILATERAL URETERS WITH INTRALUMINAL DEVICE, VIA NATURAL OR ARTIFICIAL OPENING ENDOSCOPIC: ICD-10-PCS | Performed by: UROLOGY

## 2020-06-13 PROCEDURE — 2580000003 HC RX 258: Performed by: INTERNAL MEDICINE

## 2020-06-13 PROCEDURE — 76000 FLUOROSCOPY <1 HR PHYS/QHP: CPT

## 2020-06-13 PROCEDURE — 96375 TX/PRO/DX INJ NEW DRUG ADDON: CPT

## 2020-06-13 PROCEDURE — 51702 INSERT TEMP BLADDER CATH: CPT

## 2020-06-13 PROCEDURE — 80069 RENAL FUNCTION PANEL: CPT

## 2020-06-13 PROCEDURE — 85007 BL SMEAR W/DIFF WBC COUNT: CPT

## 2020-06-13 PROCEDURE — 6360000002 HC RX W HCPCS: Performed by: EMERGENCY MEDICINE

## 2020-06-13 PROCEDURE — 6360000004 HC RX CONTRAST MEDICATION: Performed by: UROLOGY

## 2020-06-13 PROCEDURE — 2500000003 HC RX 250 WO HCPCS: Performed by: UROLOGY

## 2020-06-13 DEVICE — VARIABLE LENGTH URETERAL STENT
Type: IMPLANTABLE DEVICE | Site: URETER | Status: FUNCTIONAL
Brand: CONTOUR VL™

## 2020-06-13 RX ORDER — DEXAMETHASONE SODIUM PHOSPHATE 4 MG/ML
INJECTION, SOLUTION INTRA-ARTICULAR; INTRALESIONAL; INTRAMUSCULAR; INTRAVENOUS; SOFT TISSUE PRN
Status: DISCONTINUED | OUTPATIENT
Start: 2020-06-13 | End: 2020-06-13 | Stop reason: SDUPTHER

## 2020-06-13 RX ORDER — TIZANIDINE 4 MG/1
2 TABLET ORAL 4 TIMES DAILY PRN
Status: DISCONTINUED | OUTPATIENT
Start: 2020-06-13 | End: 2020-06-16 | Stop reason: HOSPADM

## 2020-06-13 RX ORDER — LIDOCAINE HYDROCHLORIDE 20 MG/ML
INJECTION, SOLUTION INTRAVENOUS PRN
Status: DISCONTINUED | OUTPATIENT
Start: 2020-06-13 | End: 2020-06-13 | Stop reason: SDUPTHER

## 2020-06-13 RX ORDER — SODIUM CHLORIDE 9 MG/ML
INJECTION, SOLUTION INTRAVENOUS CONTINUOUS PRN
Status: DISCONTINUED | OUTPATIENT
Start: 2020-06-13 | End: 2020-06-13 | Stop reason: SDUPTHER

## 2020-06-13 RX ORDER — DEXTROSE MONOHYDRATE 25 G/50ML
12.5 INJECTION, SOLUTION INTRAVENOUS PRN
Status: DISCONTINUED | OUTPATIENT
Start: 2020-06-13 | End: 2020-06-16 | Stop reason: HOSPADM

## 2020-06-13 RX ORDER — FENTANYL CITRATE 50 UG/ML
25 INJECTION, SOLUTION INTRAMUSCULAR; INTRAVENOUS EVERY 5 MIN PRN
Status: CANCELLED | OUTPATIENT
Start: 2020-06-13

## 2020-06-13 RX ORDER — SODIUM CHLORIDE 0.9 % (FLUSH) 0.9 %
10 SYRINGE (ML) INJECTION PRN
Status: DISCONTINUED | OUTPATIENT
Start: 2020-06-13 | End: 2020-06-16 | Stop reason: HOSPADM

## 2020-06-13 RX ORDER — HEPARIN SODIUM 5000 [USP'U]/ML
5000 INJECTION, SOLUTION INTRAVENOUS; SUBCUTANEOUS EVERY 8 HOURS SCHEDULED
Status: DISCONTINUED | OUTPATIENT
Start: 2020-06-13 | End: 2020-06-14

## 2020-06-13 RX ORDER — MORPHINE SULFATE 4 MG/ML
4 INJECTION, SOLUTION INTRAMUSCULAR; INTRAVENOUS
Status: DISCONTINUED | OUTPATIENT
Start: 2020-06-13 | End: 2020-06-14

## 2020-06-13 RX ORDER — SODIUM CHLORIDE 9 MG/ML
INJECTION, SOLUTION INTRAVENOUS CONTINUOUS
Status: DISCONTINUED | OUTPATIENT
Start: 2020-06-13 | End: 2020-06-13

## 2020-06-13 RX ORDER — ACETAMINOPHEN 650 MG/1
650 SUPPOSITORY RECTAL EVERY 6 HOURS PRN
Status: DISCONTINUED | OUTPATIENT
Start: 2020-06-13 | End: 2020-06-16 | Stop reason: HOSPADM

## 2020-06-13 RX ORDER — POLYETHYLENE GLYCOL 3350 17 G/17G
17 POWDER, FOR SOLUTION ORAL DAILY PRN
Status: DISCONTINUED | OUTPATIENT
Start: 2020-06-13 | End: 2020-06-16 | Stop reason: HOSPADM

## 2020-06-13 RX ORDER — ACETAMINOPHEN 325 MG/1
650 TABLET ORAL EVERY 6 HOURS PRN
Status: DISCONTINUED | OUTPATIENT
Start: 2020-06-13 | End: 2020-06-16 | Stop reason: HOSPADM

## 2020-06-13 RX ORDER — SODIUM CHLORIDE 0.9 % (FLUSH) 0.9 %
10 SYRINGE (ML) INJECTION EVERY 12 HOURS SCHEDULED
Status: DISCONTINUED | OUTPATIENT
Start: 2020-06-13 | End: 2020-06-16 | Stop reason: HOSPADM

## 2020-06-13 RX ORDER — PANTOPRAZOLE SODIUM 40 MG/1
40 TABLET, DELAYED RELEASE ORAL
Status: DISCONTINUED | OUTPATIENT
Start: 2020-06-13 | End: 2020-06-16 | Stop reason: HOSPADM

## 2020-06-13 RX ORDER — PROPOFOL 10 MG/ML
INJECTION, EMULSION INTRAVENOUS PRN
Status: DISCONTINUED | OUTPATIENT
Start: 2020-06-13 | End: 2020-06-13 | Stop reason: SDUPTHER

## 2020-06-13 RX ORDER — HYDRALAZINE HYDROCHLORIDE 20 MG/ML
5 INJECTION INTRAMUSCULAR; INTRAVENOUS EVERY 10 MIN PRN
Status: CANCELLED | OUTPATIENT
Start: 2020-06-13

## 2020-06-13 RX ORDER — ONDANSETRON 2 MG/ML
INJECTION INTRAMUSCULAR; INTRAVENOUS PRN
Status: DISCONTINUED | OUTPATIENT
Start: 2020-06-13 | End: 2020-06-13 | Stop reason: SDUPTHER

## 2020-06-13 RX ORDER — MORPHINE SULFATE 2 MG/ML
1 INJECTION, SOLUTION INTRAMUSCULAR; INTRAVENOUS
Status: DISCONTINUED | OUTPATIENT
Start: 2020-06-13 | End: 2020-06-13

## 2020-06-13 RX ORDER — ONDANSETRON 2 MG/ML
4 INJECTION INTRAMUSCULAR; INTRAVENOUS
Status: CANCELLED | OUTPATIENT
Start: 2020-06-13 | End: 2020-06-13

## 2020-06-13 RX ORDER — MORPHINE SULFATE 2 MG/ML
2 INJECTION, SOLUTION INTRAMUSCULAR; INTRAVENOUS
Status: DISCONTINUED | OUTPATIENT
Start: 2020-06-13 | End: 2020-06-14

## 2020-06-13 RX ORDER — NICOTINE POLACRILEX 4 MG
15 LOZENGE BUCCAL PRN
Status: DISCONTINUED | OUTPATIENT
Start: 2020-06-13 | End: 2020-06-16 | Stop reason: HOSPADM

## 2020-06-13 RX ORDER — PROMETHAZINE HYDROCHLORIDE 12.5 MG/1
12.5 TABLET ORAL EVERY 6 HOURS PRN
Status: DISCONTINUED | OUTPATIENT
Start: 2020-06-13 | End: 2020-06-16 | Stop reason: HOSPADM

## 2020-06-13 RX ORDER — ROCURONIUM BROMIDE 10 MG/ML
INJECTION, SOLUTION INTRAVENOUS PRN
Status: DISCONTINUED | OUTPATIENT
Start: 2020-06-13 | End: 2020-06-13 | Stop reason: SDUPTHER

## 2020-06-13 RX ORDER — DEXTROSE MONOHYDRATE 50 MG/ML
100 INJECTION, SOLUTION INTRAVENOUS PRN
Status: DISCONTINUED | OUTPATIENT
Start: 2020-06-13 | End: 2020-06-16 | Stop reason: HOSPADM

## 2020-06-13 RX ORDER — GABAPENTIN 100 MG/1
100 CAPSULE ORAL NIGHTLY
Status: DISCONTINUED | OUTPATIENT
Start: 2020-06-13 | End: 2020-06-16 | Stop reason: HOSPADM

## 2020-06-13 RX ORDER — MORPHINE SULFATE 2 MG/ML
2 INJECTION, SOLUTION INTRAMUSCULAR; INTRAVENOUS ONCE
Status: COMPLETED | OUTPATIENT
Start: 2020-06-13 | End: 2020-06-13

## 2020-06-13 RX ORDER — ONDANSETRON 2 MG/ML
4 INJECTION INTRAMUSCULAR; INTRAVENOUS EVERY 6 HOURS PRN
Status: DISCONTINUED | OUTPATIENT
Start: 2020-06-13 | End: 2020-06-16 | Stop reason: HOSPADM

## 2020-06-13 RX ORDER — SODIUM CHLORIDE 9 MG/ML
INJECTION, SOLUTION INTRAVENOUS ONCE
Status: DISCONTINUED | OUTPATIENT
Start: 2020-06-13 | End: 2020-06-13 | Stop reason: HOSPADM

## 2020-06-13 RX ADMIN — DEXAMETHASONE SODIUM PHOSPHATE 4 MG: 4 INJECTION, SOLUTION INTRAMUSCULAR; INTRAVENOUS at 15:17

## 2020-06-13 RX ADMIN — Medication: at 23:24

## 2020-06-13 RX ADMIN — PANTOPRAZOLE SODIUM 40 MG: 40 TABLET, DELAYED RELEASE ORAL at 06:37

## 2020-06-13 RX ADMIN — SODIUM CHLORIDE: 900 INJECTION INTRAVENOUS at 02:30

## 2020-06-13 RX ADMIN — PHENYLEPHRINE HYDROCHLORIDE 100 MCG: 10 INJECTION INTRAVENOUS at 15:16

## 2020-06-13 RX ADMIN — MORPHINE SULFATE 1 MG: 2 INJECTION, SOLUTION INTRAMUSCULAR; INTRAVENOUS at 07:39

## 2020-06-13 RX ADMIN — ONDANSETRON 4 MG: 2 INJECTION INTRAMUSCULAR; INTRAVENOUS at 00:09

## 2020-06-13 RX ADMIN — SODIUM CHLORIDE: 900 INJECTION INTRAVENOUS at 14:55

## 2020-06-13 RX ADMIN — MORPHINE SULFATE 4 MG: 4 INJECTION, SOLUTION INTRAMUSCULAR; INTRAVENOUS at 23:21

## 2020-06-13 RX ADMIN — ROCURONIUM BROMIDE 30 MG: 10 INJECTION INTRAVENOUS at 15:05

## 2020-06-13 RX ADMIN — Medication: at 17:04

## 2020-06-13 RX ADMIN — MORPHINE SULFATE 2 MG: 2 INJECTION, SOLUTION INTRAMUSCULAR; INTRAVENOUS at 15:54

## 2020-06-13 RX ADMIN — SUGAMMADEX 200 MG: 100 INJECTION, SOLUTION INTRAVENOUS at 15:29

## 2020-06-13 RX ADMIN — ONDANSETRON 4 MG: 2 INJECTION INTRAMUSCULAR; INTRAVENOUS at 07:43

## 2020-06-13 RX ADMIN — GABAPENTIN 100 MG: 100 CAPSULE ORAL at 21:32

## 2020-06-13 RX ADMIN — MORPHINE SULFATE 2 MG: 4 INJECTION, SOLUTION INTRAMUSCULAR; INTRAVENOUS at 00:07

## 2020-06-13 RX ADMIN — PHENYLEPHRINE HYDROCHLORIDE 100 MCG: 10 INJECTION INTRAVENOUS at 15:26

## 2020-06-13 RX ADMIN — LIDOCAINE HYDROCHLORIDE 6 MG: 20 INJECTION, SOLUTION INTRAVENOUS at 15:04

## 2020-06-13 RX ADMIN — PROPOFOL 80 MG: 10 INJECTION, EMULSION INTRAVENOUS at 15:04

## 2020-06-13 RX ADMIN — ONDANSETRON 4 MG: 2 INJECTION INTRAMUSCULAR; INTRAVENOUS at 23:23

## 2020-06-13 RX ADMIN — ONDANSETRON 4 MG: 2 INJECTION INTRAMUSCULAR; INTRAVENOUS at 15:17

## 2020-06-13 RX ADMIN — CEFTRIAXONE SODIUM 1 G: 1 INJECTION, POWDER, FOR SOLUTION INTRAMUSCULAR; INTRAVENOUS at 15:10

## 2020-06-13 RX ADMIN — PHENYLEPHRINE HYDROCHLORIDE 100 MCG: 10 INJECTION INTRAVENOUS at 15:03

## 2020-06-13 ASSESSMENT — PULMONARY FUNCTION TESTS
PIF_VALUE: 21
PIF_VALUE: 2
PIF_VALUE: 0
PIF_VALUE: 1
PIF_VALUE: 20
PIF_VALUE: 20
PIF_VALUE: 0
PIF_VALUE: 19
PIF_VALUE: 20
PIF_VALUE: 19
PIF_VALUE: 19
PIF_VALUE: 3
PIF_VALUE: 2
PIF_VALUE: 1
PIF_VALUE: 19
PIF_VALUE: 0
PIF_VALUE: 20
PIF_VALUE: 19
PIF_VALUE: 20
PIF_VALUE: 21
PIF_VALUE: 1
PIF_VALUE: 19
PIF_VALUE: 3
PIF_VALUE: 22
PIF_VALUE: 2
PIF_VALUE: 3
PIF_VALUE: 0
PIF_VALUE: 19
PIF_VALUE: 2
PIF_VALUE: 0
PIF_VALUE: 0
PIF_VALUE: 19
PIF_VALUE: 23
PIF_VALUE: 19
PIF_VALUE: 20
PIF_VALUE: 19

## 2020-06-13 ASSESSMENT — PAIN DESCRIPTION - FREQUENCY
FREQUENCY: CONTINUOUS
FREQUENCY: CONTINUOUS
FREQUENCY: INTERMITTENT

## 2020-06-13 ASSESSMENT — PAIN SCALES - GENERAL
PAINLEVEL_OUTOF10: 5
PAINLEVEL_OUTOF10: 0
PAINLEVEL_OUTOF10: 4
PAINLEVEL_OUTOF10: 8
PAINLEVEL_OUTOF10: 8
PAINLEVEL_OUTOF10: 10
PAINLEVEL_OUTOF10: 0
PAINLEVEL_OUTOF10: 0
PAINLEVEL_OUTOF10: 8

## 2020-06-13 ASSESSMENT — PAIN DESCRIPTION - PAIN TYPE
TYPE: ACUTE PAIN
TYPE: CHRONIC PAIN
TYPE: CHRONIC PAIN

## 2020-06-13 ASSESSMENT — PAIN DESCRIPTION - PROGRESSION
CLINICAL_PROGRESSION: GRADUALLY IMPROVING
CLINICAL_PROGRESSION: NOT CHANGED
CLINICAL_PROGRESSION: GRADUALLY IMPROVING

## 2020-06-13 ASSESSMENT — PAIN DESCRIPTION - LOCATION
LOCATION: BACK
LOCATION: FLANK
LOCATION: BACK

## 2020-06-13 ASSESSMENT — PAIN DESCRIPTION - ORIENTATION
ORIENTATION: LOWER
ORIENTATION: LOWER

## 2020-06-13 ASSESSMENT — PAIN DESCRIPTION - DESCRIPTORS
DESCRIPTORS: DISCOMFORT
DESCRIPTORS: ACHING
DESCRIPTORS: ACHING;CRAMPING

## 2020-06-13 ASSESSMENT — PAIN DESCRIPTION - ONSET: ONSET: ON-GOING

## 2020-06-13 ASSESSMENT — PAIN - FUNCTIONAL ASSESSMENT: PAIN_FUNCTIONAL_ASSESSMENT: PREVENTS OR INTERFERES SOME ACTIVE ACTIVITIES AND ADLS

## 2020-06-13 NOTE — PROGRESS NOTES
1540: Arrived to PACU from OR. Monitors applied, alarms on. Report obtained from [facility] and SANKET Hickman CRNA. 1554: Medicated for low back pain. Turned and repositioned in bed, warm blankets on.  1628: Transported to room 4123.

## 2020-06-13 NOTE — CONSULTS
at [facility]  06/11    Basal cell carcinoma resection    SKIN CANCER EXCISION  3/15    basal cell - mohs    TONSILLECTOMY  1953    TOTAL KNEE [facility]THROPLASTY  5/5/08    [facility]    TOTAL KNEE ARTHROPLASTY  2002    left    TUBAL LIGATION  1979    UPPER GASTROINTESTINAL ENDOSCOPY N/A 10/22/2018    EGD BIOPSY performed by Hiren Jaimes MD at [place] [address]       Current Medications:   Current Facility-Administered Medications: gabapentin (NEURONTIN) capsule 100 mg, 100 mg, Oral, Nightly  pantoprazole (PROTONIX) tablet 40 mg, 40 mg, Oral, QAM AC  tiZANidine (ZANAFLEX) tablet 2 mg, 2 mg, Oral, 4x Daily PRN  sodium chloride flush 0.9 % injection 10 mL, 10 mL, Intravenous, 2 times per day  sodium chloride flush 0.9 % injection 10 mL, 10 mL, Intravenous, PRN  acetaminophen (TYLENOL) tablet 650 mg, 650 mg, Oral, Q6H PRN **OR** acetaminophen (TYLENOL) suppository 650 mg, 650 mg, Rectal, Q6H PRN  polyethylene glycol (GLYCOLAX) packet 17 g, 17 g, Oral, Daily PRN  promethazine (PHENERGAN) tablet 12.5 mg, 12.5 mg, Oral, Q6H PRN **OR** ondansetron (ZOFRAN) injection 4 mg, 4 mg, Intravenous, Q6H PRN  [Held by provider] heparin (porcine) injection 5,000 Units, 5,000 Units, Subcutaneous, 3 times per day  morphine (PF) injection 1 mg, 1 mg, Intravenous, Q3H PRN  sodium bicarbonate 100 mEq in sodium chloride 0.45 % 1,000 mL infusion, , Intravenous, Continuous    Allergies:  Adhesive tape    Social History:   TOBACCO:   reports that she quit smoking about 36 years ago. Her smoking use included cigarettes. She started smoking about 52 years ago. She has a 32.00 pack-year smoking history. She has never used smokeless tobacco.  ETOH:   reports current alcohol use. DRUGS:   reports no history of drug use.   MARITAL STATUS:    OCCUPATION:      Family History:       Problem Relation Age of Onset    Cancer Father     Asthma Son     Asthma Daughter        REVIEW OF SYSTEMS:      PHYSICAL 8.0  5.0   Hyaline Casts, UA Latest Units: /LPF 0   Mucus, UA Latest Ref Range: NEGATIVE HPF RARE (A)   WBC, UA Latest Ref Range: 0 - 5 /HPF 8 (H)   RBC, UA Latest Ref Range: 0 - 6 /HPF 16 (H)   Squam Epithel, UA Latest Units: /HPF 7   non squam epi cells Latest Units: /HPF 1   Bacteria, UA Latest Ref Range: NEGATIVE /HPF RARE (A)   Amorphous, UA Latest Units: /HPF OCCASIONAL   Ictotest Unknown POSITIVE   Trichomonas, UA Latest Ref Range: NONE SEEN /HPF NONE SEEN       Imaging:    Ct Abdomen Pelvis Wo Contrast Additional Contrast? None    Result Date: 6/13/2020  EXAMINATION: CT OF THE ABDOMEN AND PELVIS WITHOUT CONTRAST 6/12/2020 10:32 pm TECHNIQUE: CT of the abdomen and pelvis was performed without the administration of intravenous contrast. Multiplanar reformatted images are provided for review. Dose modulation, iterative reconstruction, and/or weight based adjustment of the mA/kV was utilized to reduce the radiation dose to as low as reasonably achievable. COMPARISON: CT abdomen and pelvis 05/12/2020; abdominal MRI 06/05/2020; abdominal ultrasound 06/01/2020 and 06/02/2020 HISTORY: ORDERING SYSTEM PROVIDED HISTORY: rapidly growing tumor, having increased urinary symptoms, was concerned was tracking retroperitoneal and causing hydro last week, new from just a couple of weeks ago and now increasing urinary symptoms Relevant Medical/Surgical History: Flank Pain (R sided, told to come in by Dr Lisa Dumont) FINDINGS: LOWER CHEST Lung bases: Unchanged small left pleural effusion with basilar atelectasis. Moderate hiatal hernia with posterior mediastinal fluid is also similar. Included heart demonstrates coronary calcifications along the LAD territory. ABDOMEN Liver: Normal liver size, contour and parenchymal attenuation. No focal lesion on this noncontrast evaluation. Gallbladder: Distended gallbladder. Previously seen sludge/stones on comparison MRI are inconspicuous. Biliary: No intra or extrahepatic bile duct dilatation. lesion is identified. Kidneys: The kidneys enhance symmetrically. There is bilateral hydronephrosis extending into the proximal ureter as well. No significant perinephric stranding. There is an 11 mm complex right upper pole renal lesion with layering T1 hyperintense material compatible with a complex cyst. There is a similar appearing lesion just laterally measuring up to 0.8 cm. No enhancing renal mass. There is a 1.0 cm simple cyst in the mid to lower region of the right kidney. Other: The spleen is within normal limits. The right adrenal gland is within normal limits. There is diffuse thickening of the left adrenal gland with a more focal 2.2 x 1.8 cm nodule. There are a few foci of signal loss on the T1 out of phase images within the nodule itself but overall similar in signal characteristics on the T1 in phase. The aorta is normal in caliber. Celiac axis, SMA and SRI are patent. There is a large hiatal hernia. Visualized bowel is otherwise unremarkable. There is subtle nodularity into the left upper quadrant omentum (series 17, image 32), new since the prior exam.  No obvious adenopathy is noted. Lung bases are grossly clear. No acute osseous abnormality. Small volume ascites. 1. 3.7 x 2.6 cm ill-defined irregular hypoenhancing lesion centered in the pancreatic uncinate process with extension into the pancreatic head suspicious for neoplasm until proven otherwise. There is soft tissue extension into the retroperitoneum and marsha hepatis resulting in common bile duct obstruction as well as bilateral hydronephrosis. 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 However given constellation of findings neoplasm needs to be excluded. Recommend further evaluation with EUS and ERCP as well as urologic consult for possible stenting. 2. Distended gallbladder with gallbladder sludge.  3. Mild

## 2020-06-13 NOTE — CONSULTS
Patient seen and chart reviewed. Patient complains of  Bilateral back pain( right greater than left) and nausea (no vomiting). Decreased urine output over past few days. Also with jaundice. No fevers or chills  VSS afebrile (solitary BP 91/59 at 2329 on 6/12 noted)    BUN 70 and  Cr 8.5 noted  From 6/12 22:18       Previously BUN 21 and Cr 1.3 on 5/28 14:15  NA+ 126  K+ 4.8    WBC 6.9K    CT reviewed  Showing No significant change mild to moderate  bilateral hydronephrosis since 5/12    A: Bilateral hydronephrosis and acute renal failure. Will discuss with nephrology and urology about need for emergent nephrostomy. Patient not septic or hyperkalemic.

## 2020-06-13 NOTE — ED PROVIDER NOTES
Minutes per session: Not on file    Stress: Not on file   Relationships    Social connections     Talks on phone: Not on file     Gets together: Not on file     Attends Yazidi service: Not on file     Active member of club or organization: Not on file     Attends meetings of clubs or organizations: Not on file     Relationship status: Not on file    Intimate partner violence     Fear of current or ex partner: Not on file     Emotionally abused: Not on file     Physically abused: Not on file     Forced sexual activity: Not on file   Other Topics Concern    Not on file   Social History Narrative    Diet unrestricted    Exercise walking    Seatbelt always                 Current Facility-Administered Medications   Medication Dose Route Frequency Provider Last Rate Last Dose    ondansetron (ZOFRAN) injection 4 mg  4 mg Intravenous Q30 Min PRN Tanika Ibanez MD   4 mg at 06/13/20 0009    morphine sulfate (PF) injection 2 mg  2 mg Intravenous Q1H PRN Tanika Ibanez MD   2 mg at 06/13/20 0007    0.9 % sodium chloride infusion   Intravenous Continuous Tanika Ibanez MD         Current Outpatient Medications   Medication Sig Dispense Refill    omeprazole (PRILOSEC) 20 MG delayed release capsule Take 1 capsule by mouth daily 90 capsule 1    ondansetron (ZOFRAN) 4 MG tablet Take 1 tablet by mouth 3 times daily as needed for Nausea or Vomiting (Patient not taking: Reported on 5/27/2020) 30 tablet 0    metFORMIN (GLUCOPHAGE) 500 MG tablet Take 1 tablet by mouth 2 times daily (Patient not taking: Reported on 5/27/2020) 180 tablet 3    exemestane (AROMASIN) 25 MG tablet Take 25 mg by mouth daily      tiZANidine (ZANAFLEX) 2 MG tablet Take 1 tablet by mouth 4 times daily as needed (back pain) (Patient not taking: Reported on 5/27/2020) 120 tablet 1    mometasone (NASONEX) 50 MCG/ACT nasal spray 2 sprays by Nasal route daily (Patient not taking: Reported on 5/27/2020) 3 Inhaler 3    KLOR-CON M20 20 MEQ extended release tablet TAKE 1 TABLET BY MOUTH 2 TIMES DAILY (Patient not taking: Reported on 5/27/2020) 180 tablet 3    anastrozole (ARIMIDEX) 1 MG tablet TAKE 1 TABLET BY MOUTH EVERY DAY 90 tablet 3    CELEBREX 100 MG capsule Take 1 capsule by mouth 2 times daily 60 capsule 11    atorvastatin (LIPITOR) 40 MG tablet Take 1 tablet by mouth daily (Patient not taking: Reported on 5/27/2020) 30 tablet 11    losartan (COZAAR) 50 MG tablet Take 1 tablet by mouth daily 90 tablet 3    hydrochlorothiazide (HYDRODIURIL) 25 MG tablet TAKE 1 TABLET BY MOUTH EVERY DAY (Patient not taking: Reported on 5/27/2020) 30 tablet 11    furosemide (LASIX) 20 MG tablet Take 1 tablet by mouth daily (Patient not taking: Reported on 5/27/2020) 30 tablet 11    gabapentin (NEURONTIN) 100 MG capsule Take 1 capsule by mouth nightly for 360 days.  (Patient not taking: Reported on 5/27/2020) 90 capsule 3    potassium chloride (KLOR-CON M20) 20 MEQ extended release tablet Take 1 tablet by mouth 2 times daily (Patient not taking: Reported on 5/27/2020) 60 tablet 11    anastrozole (ARIMIDEX) 1 MG tablet Take 1 tablet by mouth daily 90 tablet 3    FLOVENT  MCG/ACT inhaler TAKE 2 PUFFS BY MOUTH TWICE A DAY (Patient not taking: Reported on 5/27/2020) 36 Inhaler 1    albuterol sulfate  (90 Base) MCG/ACT inhaler Inhale 2 puffs into the lungs every 6 hours as needed for Wheezing or Shortness of Breath (Patient not taking: Reported on 5/27/2020) 1 Inhaler 11    magnesium 30 MG tablet Take 50 mg by mouth 2 times daily      vitamin E 1000 units capsule Take 1,000 Units by mouth daily      aspirin (ASPIRIN CHILDRENS) 81 MG chewable tablet Take 1 tablet by mouth daily (Patient not taking: Reported on 5/27/2020) 30 tablet 3    UNABLE TO FIND Please administer two SHINGRIX vaccines 2-6 months apart (Patient not taking: Reported on 5/27/2020) 2 Units 0    albuterol (ACCUNEB) 1.25 MG/3ML nebulizer solution Inhale 3 mLs into the lungs every 6 urinate and having leg swelling that her kidneys are more involved, she is also now jaundiced, her bilirubin had gone to 7 just over the course of the week. I have already ordered labs, we will start fluids, pain medications and admit. Found to have a creatinine of 8.5, increased from 1.3 in just 2 weeks. BUN is up to 80, she is mildly hyponatremic. Her potassium is normal.  CT abdomen pelvis pending but I do see hydronephrosis bilaterally when I review, I have consulted urology as she was previously supposed to see them. Spoke with Dr. Cherie Chapa at [phone]-with retroperitoneal fibrosis, bilateral hydronephrosis and tumor involvement would likely not be best served by ureteral stents will need nephrostomy tubes instead. He would recommend consulting radiology. With as quick as this has progressed prognosis is likely poor    Spoke with Dr. Mahi Connors from IR, patient not septic and not hyperkalemic so based on their guidelines would not come in emergently tonight for nephrostomy tubes but will look at in the morning and discuss with team. I will consult nephrology as I do not suspect her kidney function to improve until we somehow circumvent this obstruction. 2345- spoke with Dr. Mita Callaway, she agrees with plan for admission, agrees this cannot wait until Monday but ok to wait until morning, they will discuss with IR in AM. OK to give fluids. Hospitalist consult placed. 46- spoke with hospitalist Dr. Jc Monte, he will accept the patient to hospitalist team    Total critical care time today provided was 34  minutes. This excludes seperately billable procedure. Critical care time provided for acute renal failure, hyperbilirubinemia that required close evaluation and/or intervention with concern for patient decompensation. Clinical Impression:  1. Acute renal failure, unspecified acute renal failure type ([address]    2. Pancreatic tumor    3. Hyperbilirubinemia      Disposition referral (if applicable):   No

## 2020-06-13 NOTE — ANESTHESIA PRE PROCEDURE
Current medications:    Current Facility-Administered Medications   Medication Dose Route Frequency Provider Last Rate Last Dose    gabapentin (NEURONTIN) capsule 100 mg  100 mg Oral Nightly Marly Woods MD        pantoprazole (PROTONIX) tablet 40 mg  40 mg Oral QAM AC Marly Woods MD   40 mg at 06/13/20 4359    tiZANidine (ZANAFLEX) tablet 2 mg  2 mg Oral 4x Daily PRN Marly Woods MD        sodium chloride flush 0.9 % injection 10 mL  10 mL Intravenous 2 times per day Marly Woods MD        sodium chloride flush 0.9 % injection 10 mL  10 mL Intravenous PRN Marly Woods MD        acetaminophen (TYLENOL) tablet 650 mg  650 mg Oral Q6H PRN Marly Woods MD        Or   Livia Lofton acetaminophen (TYLENOL) suppository 650 mg  650 mg Rectal Q6H PRN Marly Woods MD        polyethylene glycol (GLYCOLAX) packet 17 g  17 g Oral Daily PRN Marly Woods MD        promethazine (PHENERGAN) tablet 12.5 mg  12.5 mg Oral Q6H PRN Marly Woods MD        Or    ondansetron [facility]) injection 4 mg  4 mg Intravenous Q6H PRN Marly Woods MD   4 mg at 06/13/20 0743    [Held by provider] heparin (porcine) injection 5,000 Units  5,000 Units Subcutaneous 3 times per day Marly Woods MD        sodium bicarbonate 100 mEq in sodium chloride 0.45 % 1,000 mL infusion   Intravenous Continuous Fritz Chan DO        morphine (PF) injection 2 mg  2 mg Intravenous Q2H PRN Deyvi Ash MD        Or   Livia Lofton morphine sulfate (PF) injection 4 mg  4 mg Intravenous Q2H PRMARCELLE Ash MD        glucose (GLUTOSE) 40 % oral gel 15 g  15 g Oral PRN Deyvi Ash MD        dextrose 50 % IV solution  12.5 g Intravenous PRN Deyvi Ash MD        glucagon (rDNA) injection 1 mg  1 mg Intramuscular PRMARCELLE Ash MD        dextrose 5 % solution  100 mL/hr Intravenous PRMARCELLE Ash MD        insulin lispro (HUMALOG) injection vial 0-6 Units  0-6 Units Subcutaneous TID NEISHA Ash MD COLONOSCOPY SCREENING performed by Sera Perez MD at [address] [place] [address]      Basal cell carcinoma resection    SKIN CANCER EXCISION  3/15    basal cell - [facility]    TONSILLECTOMY      [facility]    TOTAL KNEE ARTHROPLASTY  2002    left    TUBAL LIGATION  1979    UPPER GASTROINTESTINAL ENDOSCOPY N/A 10/22/2018    EGD BIOPSY performed by Sera Perez MD at [place] [address] History:    Social History     Tobacco Use    Smoking status: Former Smoker     Packs/day: 2.00     Years: 16.00     Pack years: 32.00     Types: Cigarettes     Start date:      Last attempt to quit:      Years since quittin.4    Smokeless tobacco: Never Used    Tobacco comment: quit    Substance Use Topics    Alcohol use: Yes     Comment: Occasional                                Counseling given: Not Answered  Comment: quit       Vital Signs (Current):   Vitals:    20 2329 20 0002 20 0201 20 0745   BP: (!) 91/59 107/70 133/72 99/62   Pulse:  88 84 75   Resp:   18 16   Temp:   97.8 °F (36.6 °C) 97.5 °F (36.4 °C)   TempSrc:   Oral Oral   SpO2:  95%  96%   Weight:       Height:                                                  BP Readings from Last 3 Encounters:   20 99/62   20 138/70   20 138/76       NPO Status:                                                                                 BMI:   Wt Readings from Last 3 Encounters:   20 236 lb (107 kg)   20 236 lb (107 kg)   19 255 lb 9.6 oz (115.9 kg)     Body mass index is 43.15 kg/m².     CBC:   Lab Results   Component Value Date    WBC 5.8 2020    RBC 3.20 2020    HGB 9.9 2020    HCT 29.8 2020    MCV 93.1 2020    RDW 16.6 2020     2020       CMP:   Lab Results   Component Value Date     2020    K 5.3 2020    CL 94 2020    CO2 16

## 2020-06-13 NOTE — CONSULTS
Nephrology Service Consultation        [address], [address] [place] [address]  Phone: [phone]  Office Hours: 8:30AM - 4:30PM  Monday - Friday           Patient:  Chaparro Cotton  MRN: [id number]  Consulting physician:  George Fernandes MD  Reason for Consult: elevated cr, electrolyte derangements      PCP: Abner Stanton MD    HISTORY OF PRESENT ILLNESS:   The patient is a 71 y.o. female with newly found pancreatic mass presented to the ED due to abnormal outpt labs. Renal consult for serum creatinine 8 from 1.3 on 5/28/20 from 0.6 on 5/4/20. She had ct a/p with contrast on 5/12/20 that showed right- sided obstructive uropathy due to right ureter involvement and with suspicion of RT fibrosis. She had CT a/p wo contrast yesterday showing that the hydronephrosis remains. MRI on 6/5/20 mentions hydronephrosis as and bile duct obstruction. She reports poor oral intake at home but no nsaid use. BP is borderline  K is 5.3 this morning and she is more acidotic  She is on room air  She only made 50ml of urine since last night    REVIEW OF SYSTEMS:  14 point ROS is Negative.  See positive ROS per HPI    Past Medical History:        Diagnosis Date    Alopecia     Arthritis     Asthma     allergy or cold induced    Calf pain     right    Cancer ([address]     left breast cancer    Diabetes mellitus ([address]     Dysphagia     11/12 EGD lg hiatal hernia, esophageal ring s/p dilitation, 5mm polyp    Edema     HTN (hypertension)     Hx of colonoscopy     11/12 C-scope WNL    Hypercholesterolemia     Hypokalemia     Low back pain     Neck pain     5/9 MRI - mod-severe degenerative changes, c3-4 left mod foraminal narrowing; severe righ C4-5 foraminal narrowing, C5-6 severe right foraminal narrowing; small herniation T2-3    Paresthesias     Radiculopathy, lumbar region     4/9 MRI L-spine - mod central stenosis L4-5, L2-3    Vitamin D deficiency 4/12       Past Surgical History: Procedure Laterality Date    ANKLE SURGERY Right     pins and screws    BREAST LUMPECTOMY Left 09/2016    lumpectomy, LN biopsy    FOOT SURGERY  4/5    triple arthrodesis right foot    JOINT REPLACEMENT Bilateral     knee    KNEE ARTHROSCOPY      R-2000; L-2001    OR COLONOSCOPY FLX DX W/COLLJ SPEC WHEN PFRMD N/A 10/22/2018    COLONOSCOPY SCREENING performed by Ju Mcrae MD at [address]  06/11    Basal cell carcinoma resection    SKIN CANCER EXCISION  3/15    basal cell - [facility]    TONSILLECTOMY  1953    TOTAL KNEE [facility]LAST[facility]  5/5/08    [facility]    TOTAL KNEE ARTHROPLASTY  2002    left    TUBAL LIGATION  1979    UPPER GASTROINTESTINAL ENDOSCOPY N/A 10/22/2018    EGD BIOPSY performed by Ju Mcrae MD at [place] [address]       Medications:   Prior to Admission medications    Medication Sig Start Date End Date Taking?  Authorizing Provider   omeprazole (PRILOSEC) 20 MG delayed release capsule Take 1 capsule by mouth daily 6/8/20   Roxane Nageotte, MD   ondansetron [facility]) 4 MG tablet Take 1 tablet by mouth 3 times daily as needed for Nausea or Vomiting  Patient not taking: Reported on 5/27/2020 5/11/20   Roxane Nageotte, MD   metFORMIN (GLUCOPHAGE) 500 MG tablet Take 1 tablet by mouth 2 times daily  Patient not taking: Reported on 5/27/2020 5/5/20   Roxane Nageotte, MD   exemestane (AROMASIN) 25 MG tablet Take 25 mg by mouth daily 1/27/20   Historical Provider, MD   tiZANidine (ZANAFLEX) 2 MG tablet Take 1 tablet by mouth 4 times daily as needed (back pain)  Patient not taking: Reported on 5/27/2020 4/24/20   Roxane Nageotte, MD   mometasone (NASONEX) 50 MCG/ACT nasal spray 2 sprays by Nasal route daily  Patient not taking: Reported on 5/27/2020 1/15/20   Roxane Nageotte, MD   KLOR-CON M20 20 MEQ extended release tablet TAKE 1 TABLET BY MOUTH 2 TIMES DAILY  Patient not taking: Reported on 5/27/2020 11/1/19 MD Amina   UNABLE TO FIND Please administer two SHINGRIX vaccines 2-6 months apart  Patient not taking: Reported on 5/27/2020 3/14/18   Deniz Tapia MD   albuterol (ACCUNEB) 1.25 MG/3ML nebulizer solution Inhale 3 mLs into the lungs every 6 hours as needed for Wheezing 12/4/17   Deniz Tapia MD   Cholecalciferol (VITAMIN D3) 1000 UNITS TABS Take 2 tablets by mouth daily  Patient not taking: Reported on 5/27/2020 5/24/16   Deniz Tapia MD        Allergies:  Adhesive tape    Social History:   TOBACCO:   reports that she quit smoking about 36 years ago. Her smoking use included cigarettes. She started smoking about 52 years ago. She has a 32.00 pack-year smoking history. She has never used smokeless tobacco.  ETOH:   reports current alcohol use. OCCUPATION:      Family History:       Problem Relation Age of Onset    Cancer Father     Asthma Son     Asthma Daughter            Physical Exam:    Vitals: BP 99/62   Pulse 75   Temp 97.5 °F (36.4 °C) (Oral)   Resp 16   Ht 5' 2.01\" (1.575 m)   Wt 236 lb (107 kg)   LMP  (LMP Unknown)   SpO2 96%   BMI 43.15 kg/m²   General appearance: in no acute distress, appears stated age, jaundiced  Skin: Skin color is jaundiced.  No rashes or lesions  HEENT: normocephalic, atraumatic, scleral icterus  Neck: supple, trachea midline  Lungs: clear to auscultation bilaterally, breathing comfortably  Heart[de-identified] regular rate and rhythm, S1, S2 normal,  Abdomen: soft, non-tender; bowel sounds normal; no masses,   Extremities: extremities normal, atraumatic, no cyanosis or edema  Neurologic: Mental status: alert, oriented, interactive, following commands  Psychiatric: mood and affect appropriate    CBC:   Recent Labs     06/12/20 2218 06/13/20  0721   WBC 6.9 5.8   HGB 11.9* 9.9*    231     BMP:    Recent Labs     06/12/20 2218 06/13/20  0721   * 131*   K 4.8 5.3*   CL 88* 94*   CO2 18* 16*   BUN 70* 73*   CREATININE 8.5* 8.8*   GLUCOSE 83

## 2020-06-13 NOTE — CONSULTS
[address] [place] [address], [address] [facility]                                  CONSULTATION    PATIENT NAME: Remedios Bosch                         :        1950  MED REC NO:   [id number]                          ROOM:       7741  ACCOUNT NO:   [de-identified]                           ADMIT DATE: 2020  PROVIDER:     Lennox Jacinto MD    CONSULT DATE:  2020    PRIMARY CARE PROVIDER:  Morgan Hassan MD    CHIEF COMPLAINT:  Abnormal LFTs and imaging, rule out obstructive  jaundice, etiology to be determined. HISTORY OF PRESENT ILLNESS:  The patient is a 66-year-old white female,  a patient of Dr. Benitez Celeste, with past medical history significant for  hypertension, borderline diabetes mellitus, back problem, carcinoma of  the left breast, status post left lumpectomy with subsequent radiation  and chemotherapy in 2016, followed up at _____, history of  hyperlipidemia, asthma, who was admitted to the hospital with two-month  history of anorexia, upper abdominal discomfort, back pain, and about  20-pound weight loss. The patient did have a few episodes of vomiting  also earlier, but there is no history of hematemesis, melena, or  hematochezia. The patient was seen by me in a virtual consult on 2020 for  abnormal LFTs and imaging and to perform an ERCP to place a biliary  stent. The patient's LFTs have worsened in the last couple of weeks or  so. The recent CAT scan done on 2020 and the MRCP done on  2020 were reviewed with the radiologist with findings consistent  of bilateral urinary tract obstruction and pancreatic mass, hence it was  decided to admit the patient for further workup and management. There is no history of fever or chills. I called the patient yesterday and the patient was complaining of  increasing lower extremity swelling also with poor appetite.   She was  requested to come to the emergency room to have repeat CAT scan done and  blood workup as well. The blood workup done yesterday showed worsening  of her renal functions with a BUN of 70 and creatinine 8.5. LFTs also  have worsened considerably in the last couple of weeks with a total  bilirubin of 14.7, alkaline phosphatase of 1153, ALT of 132, AST of 151. Repeat CAT scan of the abdomen and pelvis was done in the emergency  room, which showed bilateral hydronephrosis, mild to moderate in  severity, and also the previously described pancreatic mass was not well  visualized; however, there was no evidence of pancreatic or biliary  ductal dilatation. The patient has been seen by the Radiology consultant, Dr. Radha Kennedy,  and Nephrology and Urology have been consulted also for bilateral  hydronephrosis. The patient's CEA level was 4.4 and  was 37. IgG4 was 22. The patient has seen Dr. Saranya Porras from Gastroenterology in the past  and has had two EGDs and colonoscopes done. The last EGD and  colonoscopy were on 10/22/2018 and the patient was noted to have small  internal hemorrhoids on colonoscopy, otherwise exam was unremarkable,  and the EGD revealed an 8-mm clean-based ulcer in the superior aspect of  the duodenal bulb along with large hiatal hernia. The patient is  hemodynamically stable at present. REVIEW OF SYSTEMS:  CENTRAL NERVOUS SYSTEM:  The patient denies headache or focal  sensorimotor symptoms. CARDIOVASCULAR SYSTEM:  No history of chest pain, but the patient  complains of shortness of breath upon exertion and leg swelling. GENITOURINARY SYSTEM:  No history of dysuria, pyuria, or hematuria. MUSCULOSKELETAL SYSTEM:  The patient complains of generalized weakness  and back pain. RESPIRATORY SYSTEM:  No history of cough, hemoptysis, fever, or chills.     PAST MEDICAL HISTORY:  Significant for history of hypertension,  borderline diabetes mellitus, hyperlipidemia, back pain, asthma,  carcinoma of the left breast, status post lumpectomy followed by  radiation and chemotherapy. FAMILY HISTORY:  The patient's father was initially diagnosed with  bladder cancer with subsequent metastasis to lung. MEDICATIONS:  Please refer to the chart. SOCIOECONOMIC HISTORY:  The patient does drink alcohol and is a former  smoker. PAST SURGICAL HISTORY:  The patient has had left lumpectomy done  followed by radiation and chemotherapy in 2016 and also has had  tonsillectomy done, bilateral knee replacements, tubal ligation, and  ankle and foot surgery. ALLERGIES:  The patient is allergic to ADHESIVE TAPE. PHYSICAL EXAMINATION:  GENERAL:  Shows a 19-year-old white female of average build and fair  nutritional status, who is lying flat in bed, in no acute distress. She  is awake, alert, and oriented and pleasant to talk with. VITAL SIGNS:  Stable. HEENT:  Shows sclerae to be icteric. NECK:  Supple. CHEST:  Shows diminished breath sounds. HEART:  S1 and S2 are normal.  ABDOMEN:  Soft, nondistended with mild tenderness in the upper abdomen. No guarding or rigidity. Liver and spleen are not palpable. RECTAL:  Deferred. CNS:  Shows the patient to be awake, alert, and oriented. There are no  focal sensorimotor signs. MUSCULOSKELETAL SYSTEM:  Shows evidence of degenerative joint disease  changes. LABORATORY DATA AND IMAGING:  As above mentioned. IMPRESSION:  3  A 19-year-old white female, presents with two-month history of  anorexia, weight loss, upper abdominal/back discomfort with abnormal  LFTs, renal function, and imaging, rule out underlying malignancy, CA of  the pancreas with local extension involving the ureters. 2.  Rule out retroperitoneal fibrosis? 3. History of carcinoma of the left breast, rule out metastatic disease    RECOMMENDATIONS:  1. Agree with present management. 2.  Radiology, nephrology, and urology consult in order for bilateral  hydronephrosis.   3.  We will monitor the patient's CBC, chem

## 2020-06-13 NOTE — H&P
History and Physical      Name:  Yumiko De León /Age/Sex: 1950  (75 y.o. female)   MRN & CSN:  [id number] & [id number] Admission Date/Time: 2020  9:10 PM   Location:  ED26/ED-26 PCP: Kimberly Correia MD       Hospital Day: 2    Assessment and Plan:   Yumiko De León is a 71 y.o.  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 Had hyperbilirubinemia and significantly elevated alkaline phosphatase.  -Hyponatremia serum sodium 891 mEq/L  -Metabolic acidosis due to renal failure    Plan  IV fluids  Nephrology has been consulted in ER  Urology has been consulted in ER  Interventional radiology has been consulted in the ER for bilateral nephrostomy tube placement  GI has been consulted in the ER plan to place biliary drain  Place Lujan        Diet No diet orders on file   DVT Prophylaxis [] Lovenox, []  Heparin, [] SCDs, [] Ambulation   GI Prophylaxis [] PPI,  [] H2 Blocker,  [] Carafate,  [] Diet/Tube Feeds   Code Status No Order   Disposition Patient requires continued admission due to    MDM [] Low, [] Moderate,[]  High  Patient's risk as above due to      History of Present Illness:     Chief Complaint: Bilateral flank pain worse in the right  Yumiko De León is a 71 y.o.  female  who presents with bilateral flank pain worse on the right. Patient presented with bilateral flank pain worse in the right over the past week. She had nausea but no vomiting. She had poor appetite. Also she had jaundice over the past 2 weeks. No pruritus. No abdominal pain, chest pain, nausea vomiting. No cough. No fever or chills. She noticed decreased urine output over the past few days.        Ten point ROS reviewed negative, unless as noted above    Objective:   No intake or output data in the 24 hours ending 20 0117 Sabi Shetty MD           Electronically signed by Jose J Chaves MD on 6/13/2020 at 1:17 AM

## 2020-06-13 NOTE — PROGRESS NOTES
Hospitalist Progress Note      Name:  Seamus Walker /Age/Sex: 1950  (75 y.o. female)   MRN & CSN:  [id number] & [id number] Admission Date/Time: 2020  9:10 PM   Location:  [address] [place], [place] [id number] PCP: Torito Kline MD       Seamus Walker is a 71 y.o.  female  who presents with Flank Pain (R sided, told to come in by Dr Lisa Dumont)      Assessment and Plan: NOEMÍ likely 2/2 post renal obstructive uropathy with bilateral hydronephrosis, AGMA  - IR consulted for nephrostomy placement  - NPO for now  - bicarb gtt started  - Urology consulted  - Nephro consulted  - hold home metformin, HCTZ, Losartan, lasix for now    HypoNa   -improved  - 2/2 to above likely, HCTZ on hold   - monitor    RLE Edema  - pt states it has started 2 weeks ago  - check venous duplex to r/o DVT    Obstructive jaundice 2/2 due to pancreatic mass  - pt states she is going to follow up at [address] [place] [address] and arrangements being made outpt  - GI consulted    DM  HTN  Neuropathy                 Diet Diet NPO Effective Now Exceptions are: Ice Chips, Sips with Meds   Code Status Full Code     Medications:   Medications:    gabapentin  100 mg Oral Nightly    pantoprazole  40 mg Oral QAM AC    sodium chloride flush  10 mL Intravenous 2 times per day    [Held by provider] heparin (porcine)  5,000 Units Subcutaneous 3 times per day      Infusions:    sodium bicarbonate infusion       PRN Meds: tiZANidine, 2 mg, 4x Daily PRN  sodium chloride flush, 10 mL, PRN  acetaminophen, 650 mg, Q6H PRN    Or  acetaminophen, 650 mg, Q6H PRN  polyethylene glycol, 17 g, Daily PRN  promethazine, 12.5 mg, Q6H PRN    Or  ondansetron, 4 mg, Q6H PRN  morphine, 1 mg, Q3H PRN      Subjective:   Doing ok, no distress    Objective:        Intake/Output Summary (Last 24 hours) at 2020 0941  Last data filed at 2020 5672  Gross per 24 hour   Intake --   Output 50 ml   Net -50 ml      Vitals:   Vitals:    20 0745   BP: 99/62   Pulse: 75   Resp: 16   Temp: 97.5 °F (36.4 °C) SpO2: 96%     Physical Exam:   Gen:  awake, alert, no apparent distress  Head/Eyes:  Normocephalic atraumatic, EOMI   NECK:   symmetrical, trachea midline  LUNGS: Normal Effort   CARDIOVASCULAR:  Normal rate, trace pedal edema RLE  ABDOMEN:  non distended  MUSCULOSKELETAL:  ROM limited  NEUROLOGIC: Alert and Oriented,  Cranial nerves II-XII are grossly intact.    SKIN:  no bruising or bleeding, normal skin color,  no redness      Data:       CBC   Recent Labs     06/12/20 2218 06/13/20  0721   WBC 6.9 5.8   HGB 11.9* 9.9*   HCT 33.4* 29.8*    231      BMP   Recent Labs     06/12/20 2218 06/13/20  0721   * 131*   K 4.8 5.3*   CL 88* 94*   CO2 18* 16*   PHOS  --  6.9*   BUN 70* 73*   CREATININE 8.5* 8.8*         Electronically signed by Fabiola Edward MD on 6/13/2020 at 9:41 AM

## 2020-06-13 NOTE — CONSULTS
Included heart demonstrates coronary calcifications along the LAD territory. ABDOMEN Liver: Normal liver size, contour and parenchymal attenuation. No focal lesion on this noncontrast evaluation. Gallbladder: Distended gallbladder. Previously seen sludge/stones on comparison MRI are inconspicuous. Biliary: No intra or extrahepatic bile duct dilatation. Pancreas: Previously seen ill-defined soft tissue at the pancreatic uncinate process and pancreatic head is relatively inconspicuous on this noncontrast exam. Spleen: Normal. Adrenals: Stable indeterminate left adrenal nodule. Normal right adrenal gland. Kidneys: Kidneys are symmetric in size. Known renal cysts are better seen on comparison MRI. Unchanged hydronephrosis. GI Tract: Unchanged hiatal hernia. No apparent bowel wall thickening or obstruction. Colonic diverticulosis. Peritoneum/Retroperitoneum: Small volume abdominal ascites with mesenteric edema. No free air. No appreciable lymphadenopathy. Vascular: Normal caliber abdominal aorta and IVC. PELVIS Genitourinary: Normal urinary bladder. Unremarkable uterus. Ovaries are not well delineated amidst fluid in bowel. Other: Pelvic ascites. No enlarged lymph nodes. MUSCULOSKELETAL Bones and Soft Tissues: No acute superficial soft tissue or osseous abnormality. No suspicious osteolytic or osteoblastic lesion. Advanced thoracolumbar spondylosis with facet arthrosis and grade 1 retrolisthesis of L2 on L3 and L3 on L4.      No substantial change in degree of bilateral hydronephrosis, mild-to-moderate in severity. Previously described pancreatic head/uncinate process infiltrative mass is not well delineated on this noncontrast exam.  No pancreatic or bile duct dilatation. Distended gallbladder, presumably sludge filled as seen on prior MRI. Small volume abdominal ascites, similar to prior. Colonic diverticulosis without evidence of diverticulitis.  Moderate paraesophageal hiatal hernia with adjacent fluid, unchanged. Stable indeterminate left adrenal gland nodule. Small volume left pleural effusion is unchanged.           Mri Abdomen W Wo Contrast Mrcp     Result Date: 6/8/2020  EXAMINATION: MRI OF THE ABDOMEN WITH AND WITHOUT CONTRAST AND MRCP 6/5/2020 9:51 am TECHNIQUE: Multiplanar multisequence MRI of the abdomen was performed with and without the administration of intravenous contrast.  After initial T2 axial and coronal images, thick slab, thin slab and 3D coronal MRCP sequences were obtained without the administration of intravenous contrast.  MIP images are provided for review. COMPARISON: Ultrasound 06/02/2020, 06/01/2020; CT 05/12/2020; MRI lumbar spine 08/24/2015. HISTORY: ORDERING SYSTEM PROVIDED HISTORY: Abnormal ultrasound of gallbladder TECHNOLOGIST PROVIDED HISTORY: Reason for Exam: chronic nause and difficalty eating. abdominal pain Acuity: Chronic Type of Exam: Initial Additional signs and symptoms: HX of breast CA. 20 ml Prohance used. No HX of trauma FINDINGS: Pancreas: There is an irregular hypoenhancing lesion centered within the pancreatic head/uncinate process measuring 3.7 x 2.6 cm (series 19 image 31). The remaining pancreatic body and tail are relatively normal in appearance and enhancing without duct dilation. There is soft tissue infiltration into the retroperitoneum and marsha hepatis. There is probably at least 50% encasement of the SMA without significant involvement of the celiac axis. The main portal vein and SMV are markedly attenuated at the level of the mass secondary to greater than 50% encasement. The splenic vein at this level is not visualized and is likely occluded. Soft tissue also extends along the anterior aspect of the aorta and IVC. Gallbladder: Gallbladder is distended with layering T1 hyperintense material along the dependent aspect. No significant wall thickening or pericholecystic fluid. No definite cholelithiasis. Bile Ducts:  There is intra and extrahepatic to be less likely given extension into the marsha hepatis, however, an IG 4 related disease can be a consideration. However given constellation of findings neoplasm needs to be excluded. Recommend further evaluation with EUS and ERCP as well as urologic consult for possible stenting. 2. Distended gallbladder with gallbladder sludge. 3. Mild hepatic steatosis. 4. Bosniak type 1 and type 2 right renal cysts. 5. Indeterminate 2.2 x 1.8 cm left adrenal nodule. There are a few foci of signal loss on T1 out of phase images, however, this has overall increased in size since 2015 raising suspicion for metastatic disease. 6. Small volume ascites. 7. Subtle nodularity is noted in left upper quadrant omentum some of which could be related to small collateral vasculature. Attention on follow-up exams as early carcinomatosis cannot be excluded. Results were called by Dr. Cherrie Barnett. Amanda Oropeza MD to Sandeep Mulligan on 6/5/2020 at 12:37.         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 If this does not improve her creatinine then IR consultation will be needed for bilateral ureteral stent insertions. Consent signed/witnessed/placed in chart, all questions answered.

## 2020-06-13 NOTE — ED NOTES
Dr Madelin Blanca returned call @ [phone] -- transferred call to Dr Nikhil Winslow.      Tomy Littlejohn  06/12/20 1735

## 2020-06-14 ENCOUNTER — APPOINTMENT (OUTPATIENT)
Dept: ULTRASOUND IMAGING | Age: 70
DRG: 659 | End: 2020-06-14
Payer: MEDICARE

## 2020-06-14 LAB
ALBUMIN SERPL-MCNC: 3.2 GM/DL (ref 3.4–5)
ALBUMIN SERPL-MCNC: 3.2 GM/DL (ref 3.4–5)
ALP BLD-CCNC: 1007 IU/L (ref 40–129)
ALT SERPL-CCNC: 111 U/L (ref 10–40)
AMYLASE: 57 U/L (ref 25–115)
ANION GAP SERPL CALCULATED.3IONS-SCNC: 23 MMOL/L (ref 4–16)
APTT: 34 SECONDS (ref 25.1–37.1)
AST SERPL-CCNC: 98 IU/L (ref 15–37)
BASOPHILS ABSOLUTE: 0 K/CU MM
BASOPHILS RELATIVE PERCENT: 0.2 % (ref 0–1)
BILIRUB SERPL-MCNC: 11.7 MG/DL (ref 0–1)
BILIRUBIN DIRECT: 8.7 MG/DL (ref 0–0.3)
BILIRUBIN, INDIRECT: 3 MG/DL (ref 0–0.7)
BUN BLDV-MCNC: 70 MG/DL (ref 6–23)
CALCIUM SERPL-MCNC: 8.9 MG/DL (ref 8.3–10.6)
CHLORIDE BLD-SCNC: 91 MMOL/L (ref 99–110)
CO2: 21 MMOL/L (ref 21–32)
CREAT SERPL-MCNC: 6.5 MG/DL (ref 0.6–1.1)
CULTURE: NORMAL
DIFFERENTIAL TYPE: ABNORMAL
EOSINOPHILS ABSOLUTE: 0 K/CU MM
EOSINOPHILS RELATIVE PERCENT: 0 % (ref 0–3)
GFR AFRICAN AMERICAN: 8 ML/MIN/1.73M2
GFR NON-AFRICAN AMERICAN: 6 ML/MIN/1.73M2
GLUCOSE BLD-MCNC: 101 MG/DL (ref 70–99)
GLUCOSE BLD-MCNC: 111 MG/DL (ref 70–99)
GLUCOSE BLD-MCNC: 122 MG/DL (ref 70–99)
GLUCOSE BLD-MCNC: 127 MG/DL (ref 70–99)
GLUCOSE BLD-MCNC: 90 MG/DL (ref 70–99)
HCT VFR BLD CALC: 28.7 % (ref 37–47)
HEMOGLOBIN: 10.3 GM/DL (ref 12.5–16)
IMMATURE NEUTROPHIL %: 0.8 % (ref 0–0.43)
INR BLD: 1.38 INDEX
LIPASE: 49 IU/L (ref 13–60)
LYMPHOCYTES ABSOLUTE: 1.1 K/CU MM
LYMPHOCYTES RELATIVE PERCENT: 20.7 % (ref 24–44)
Lab: NORMAL
MCH RBC QN AUTO: 30.8 PG (ref 27–31)
MCHC RBC AUTO-ENTMCNC: 35.9 % (ref 32–36)
MCV RBC AUTO: 85.9 FL (ref 78–100)
MONOCYTES ABSOLUTE: 0.4 K/CU MM
MONOCYTES RELATIVE PERCENT: 7.9 % (ref 0–4)
NUCLEATED RBC %: 0 %
PDW BLD-RTO: 15.8 % (ref 11.7–14.9)
PHOSPHORUS: 7 MG/DL (ref 2.5–4.9)
PLATELET # BLD: 278 K/CU MM (ref 140–440)
PMV BLD AUTO: 12.7 FL (ref 7.5–11.1)
POTASSIUM SERPL-SCNC: 5.2 MMOL/L (ref 3.5–5.1)
PROTHROMBIN TIME: 16.7 SECONDS (ref 11.7–14.5)
RBC # BLD: 3.34 M/CU MM (ref 4.2–5.4)
SEGMENTED NEUTROPHILS ABSOLUTE COUNT: 3.7 K/CU MM
SEGMENTED NEUTROPHILS RELATIVE PERCENT: 70.4 % (ref 36–66)
SODIUM BLD-SCNC: 135 MMOL/L (ref 135–145)
SPECIMEN: NORMAL
TOTAL IMMATURE NEUTOROPHIL: 0.04 K/CU MM
TOTAL NUCLEATED RBC: 0 K/CU MM
TOTAL PROTEIN: 6.4 GM/DL (ref 6.4–8.2)
WBC # BLD: 5.2 K/CU MM (ref 4–10.5)

## 2020-06-14 PROCEDURE — 6370000000 HC RX 637 (ALT 250 FOR IP): Performed by: FAMILY MEDICINE

## 2020-06-14 PROCEDURE — 85610 PROTHROMBIN TIME: CPT

## 2020-06-14 PROCEDURE — 6370000000 HC RX 637 (ALT 250 FOR IP): Performed by: UROLOGY

## 2020-06-14 PROCEDURE — 82150 ASSAY OF AMYLASE: CPT

## 2020-06-14 PROCEDURE — 80053 COMPREHEN METABOLIC PANEL: CPT

## 2020-06-14 PROCEDURE — 2580000003 HC RX 258: Performed by: INTERNAL MEDICINE

## 2020-06-14 PROCEDURE — 80069 RENAL FUNCTION PANEL: CPT

## 2020-06-14 PROCEDURE — 36415 COLL VENOUS BLD VENIPUNCTURE: CPT

## 2020-06-14 PROCEDURE — 82962 GLUCOSE BLOOD TEST: CPT

## 2020-06-14 PROCEDURE — 80076 HEPATIC FUNCTION PANEL: CPT

## 2020-06-14 PROCEDURE — 85025 COMPLETE CBC W/AUTO DIFF WBC: CPT

## 2020-06-14 PROCEDURE — 83690 ASSAY OF LIPASE: CPT

## 2020-06-14 PROCEDURE — 93971 EXTREMITY STUDY: CPT

## 2020-06-14 PROCEDURE — 1200000000 HC SEMI PRIVATE

## 2020-06-14 PROCEDURE — 85730 THROMBOPLASTIN TIME PARTIAL: CPT

## 2020-06-14 RX ORDER — TRAMADOL HYDROCHLORIDE 50 MG/1
50 TABLET ORAL EVERY 6 HOURS PRN
Status: DISCONTINUED | OUTPATIENT
Start: 2020-06-14 | End: 2020-06-16 | Stop reason: HOSPADM

## 2020-06-14 RX ORDER — MORPHINE SULFATE 2 MG/ML
2 INJECTION, SOLUTION INTRAMUSCULAR; INTRAVENOUS
Status: DISCONTINUED | OUTPATIENT
Start: 2020-06-14 | End: 2020-06-14

## 2020-06-14 RX ORDER — TRAMADOL HYDROCHLORIDE 50 MG/1
100 TABLET ORAL EVERY 6 HOURS PRN
Status: DISCONTINUED | OUTPATIENT
Start: 2020-06-14 | End: 2020-06-16 | Stop reason: HOSPADM

## 2020-06-14 RX ORDER — OXYCODONE HYDROCHLORIDE AND ACETAMINOPHEN 5; 325 MG/1; MG/1
1 TABLET ORAL EVERY 6 HOURS PRN
Status: DISCONTINUED | OUTPATIENT
Start: 2020-06-14 | End: 2020-06-14

## 2020-06-14 RX ORDER — SODIUM CHLORIDE 450 MG/100ML
INJECTION, SOLUTION INTRAVENOUS CONTINUOUS
Status: DISCONTINUED | OUTPATIENT
Start: 2020-06-14 | End: 2020-06-15

## 2020-06-14 RX ADMIN — SODIUM CHLORIDE: 4.5 INJECTION, SOLUTION INTRAVENOUS at 12:30

## 2020-06-14 RX ADMIN — GABAPENTIN 100 MG: 100 CAPSULE ORAL at 22:55

## 2020-06-14 RX ADMIN — TRAMADOL HYDROCHLORIDE 100 MG: 50 TABLET, FILM COATED ORAL at 22:56

## 2020-06-14 RX ADMIN — PANTOPRAZOLE SODIUM 40 MG: 40 TABLET, DELAYED RELEASE ORAL at 06:19

## 2020-06-14 RX ADMIN — ACETAMINOPHEN 650 MG: 325 TABLET ORAL at 22:56

## 2020-06-14 RX ADMIN — SODIUM CHLORIDE: 4.5 INJECTION, SOLUTION INTRAVENOUS at 23:15

## 2020-06-14 RX ADMIN — SODIUM CHLORIDE: 4.5 INJECTION, SOLUTION INTRAVENOUS at 12:32

## 2020-06-14 ASSESSMENT — PAIN DESCRIPTION - PROGRESSION

## 2020-06-14 ASSESSMENT — PAIN SCALES - GENERAL
PAINLEVEL_OUTOF10: 4
PAINLEVEL_OUTOF10: 7
PAINLEVEL_OUTOF10: 0

## 2020-06-14 NOTE — PROGRESS NOTES
Nephrology Progress Note        [address], [address] [place] [address]  Phone: [phone]  Office Hours: 8:30AM - 4:30PM  Monday - Friday       [facility]  MD Tracy Peraza, DO [address],  [address]  [facility], [place] [address]  PHONE: [phone]  FAX: [phone]    6/14/2020 8:14 AM  Subjective:   Admit Date: 6/12/2020  PCP: Luana Lazaro MD  Interval History: VERY GOOD UOP  ON ROOm air  S/p renal obstruction relief yesterday with ureteral stents    Diet: DIET CARB CONTROL;      Data:   Scheduled Meds:   gabapentin  100 mg Oral Nightly    pantoprazole  40 mg Oral QAM AC    sodium chloride flush  10 mL Intravenous 2 times per day    heparin (porcine)  5,000 Units Subcutaneous 3 times per day    insulin lispro  0-6 Units Subcutaneous TID WC    insulin lispro  0-3 Units Subcutaneous Nightly     Continuous Infusions:   dextrose       PRN Meds:tiZANidine, sodium chloride flush, acetaminophen **OR** acetaminophen, polyethylene glycol, promethazine **OR** ondansetron, morphine **OR** morphine, glucose, dextrose, glucagon (rDNA), dextrose  I/O last 3 completed shifts: In: 640 [P.O.:340; I.V.:300]  Out: 4720 [Urine:4720]  No intake/output data recorded. Intake/Output Summary (Last 24 hours) at 6/14/2020 0814  Last data filed at 6/14/2020 0658  Gross per 24 hour   Intake 640 ml   Output 4720 ml   Net -4080 ml       CBC:   Recent Labs     06/12/20 2218 06/13/20  0721   WBC 6.9 5.8   HGB 11.9* 9.9*    231       BMP:    Recent Labs     06/12/20 2218 06/13/20  0721   * 131*   K 4.8 5.3*   CL 88* 94*   CO2 18* 16*   BUN 70* 73*   CREATININE 8.5* 8.8*   GLUCOSE 83 73     Hepatic:   Recent Labs     06/12/20 2218   *   *   BILITOT 14.7*   ALKPHOS 1,153*     Troponin: No results for input(s): TROPONINI in the last 72 hours. BNP: No results for input(s): BNP in the last 72 hours.   Lipids: No results for follow                          Electronically signed by Meera Reid DO on 6/14/2020 at 4316 Beny Quigley MD  [address]  [place],  [address]  [place], [place] [address]  PHONE: [phone]  FAX: [phone]

## 2020-06-14 NOTE — OP NOTE
[address] [address] [facility], [address] [facility]                                OPERATIVE REPORT    PATIENT NAME: Xiomara Arevalo                         :        1950  MED REC NO:   [id number]                          ROOM:       0351  ACCOUNT NO:   [de-identified]                           ADMIT DATE: 2020  PROVIDER:     Armando Anand MD    DATE OF PROCEDURE:  2020    PREOPERATIVE DIAGNOSES:  Acute renal insufficiency, hyponatremia,  obstructive jaundice secondary to pancreatic mass, diabetes,  hypertension, neuropathy, obesity. POSTOPERATIVE DIAGNOSES:  Acute renal insufficiency, hyponatremia,  obstructive jaundice secondary to pancreatic mass, diabetes,  hypertension, neuropathy, obesity. OPERATION PERFORMED:  Cystourethroscopy with bilateral retrograde  pyelograms and bilateral ureteral stent insertions. SURGEON:  Armando Anand MD    ANESTHESIA:  General anesthesia via endotracheal tube placement. ESTIMATED BLOOD LOSS:  Less than 50 mL. COMPLICATIONS:  None. SPECIMENS:  None. DRAINS:  A 16-[place] citizen indwelling Lujan catheter. OPERATIVE FINDINGS:  Bilateral hydronephrosis with some cloudy debris  emanating from the right ureteral orifice after the stent was placed. INDICATION FOR OPERATION:  This patient is a 57-year-old female, who was  seen at [facility] on 2020 with  complaints of right flank pain. She has been suffering with nausea. She had a CT scan performed and subsequent MRCP with concerns for a  tumor in her pancreas and retroperitoneal fibrosis raised. She became  jaundiced over the past 48 to 72 hours and was directed to come to the  emergency room for further evaluation and management. Due to her acute  renal insufficiency with a serum creatinine of 8, she has been n.p.o.  and plans were laid for a cystourethroscopy with bilateral retrograde  pyelograms.   After being informed of the risks and benefits, she has  signed the consent form. DESCRIPTION OF PROCEDURE:  The patient was brought to the operative  suite, placed in the supine position. General anesthesia was  administered. She has been carefully placed in the dorsal lithotomy  position and sterilely prepped and draped in the usual fashion. I  personally conducted a time-out to ensure this is the proper operation  and proper patient. Everybody in the room agreed. Rigid cystoscopy  ensued. Bladder was drained and thoroughly evaluated it without any  pathology being identified. A left retrograde pyelogram was performed  opacifying the collecting system with moderate left hydronephrosis seen  with some mild left hydroureter to a transition point in the left  proximal ureter without any distinct filling defects being identified. A sensor wire has been placed into the left renal pelvis without any  difficulty, and then over this wire we then passed a 4.8-[place] variable  length double J stent. It was noted to be in good position proximally  fluoroscopically and distally cystoscopically. The bladder was drained. I then performed a right retrograde pyelogram.  Visibility was  challenging due to bowel contents, but there was on this side moderate  right hydronephrosis and I could not again visualize her ureter very  clearly. A sensor wire has been placed in the patient's right renal  pelvis and over this wire I then passed another 4.8-[place] variable  length double J stent. It was noted to be in good position proximally  fluoroscopically and distally cystoscopically. After this was done,  there was some slightly cloudy debris that emanated from the right  ureteral orifice. The bladder was drained with a 16-[place] catheter. She tolerated the procedure well without difficulty. She will be  transferred to the PACU to recover.   Hopefully this will alleviate her  acute renal insufficiency and also alleviate

## 2020-06-14 NOTE — PROGRESS NOTES
Feels better, no distress    Objective: Intake/Output Summary (Last 24 hours) at 6/14/2020 0948  Last data filed at 6/14/2020 0658  Gross per 24 hour   Intake 640 ml   Output 4720 ml   Net -4080 ml      Vitals:   Vitals:    06/14/20 0435   BP: 103/68   Pulse: 81   Resp: 16   Temp: 98 °F (36.7 °C)   SpO2: 98%     Physical Exam:   Gen:  awake, alert, no apparent distress  Head/Eyes:  Normocephalic atraumatic, EOMI   NECK:   symmetrical, trachea midline  LUNGS: Normal Effort   CARDIOVASCULAR:  Normal rate  ABDOMEN:  non distended  MUSCULOSKELETAL:  ROM WNL  NEUROLOGIC: Alert and Oriented,  Cranial nerves II-XII are grossly intact.    SKIN:  no bruising or bleeding, normal skin color,  no redness      Data:       CBC   Recent Labs     06/12/20 2218 06/13/20  0721 06/14/20  0758   WBC 6.9 5.8 5.2   HGB 11.9* 9.9* 10.3*   HCT 33.4* 29.8* 28.7*    231 278      BMP   Recent Labs     06/12/20 2218 06/13/20  0721 06/14/20  0758   * 131* 135   K 4.8 5.3* 5.2*   CL 88* 94* 91*   CO2 18* 16* 21   PHOS  --  6.9* 7.0*   BUN 70* 73* 70*   CREATININE 8.5* 8.8* 6.5*         Electronically signed by Andrew Bradford MD on 6/14/2020 at 9:48 AM

## 2020-06-15 LAB
ALBUMIN ELP: 2.6 GM/DL (ref 3.2–5.6)
ALBUMIN SERPL-MCNC: 2.9 GM/DL (ref 3.4–5)
ALBUMIN SERPL-MCNC: 3 GM/DL (ref 3.4–5)
ALP BLD-CCNC: 850 IU/L (ref 40–129)
ALPHA-1-GLOBULIN: 0.3 GM/DL (ref 0.1–0.4)
ALPHA-2-GLOBULIN: 0.8 GM/DL (ref 0.4–1.2)
ALT SERPL-CCNC: 97 U/L (ref 10–40)
ANION GAP SERPL CALCULATED.3IONS-SCNC: 13 MMOL/L (ref 4–16)
AST SERPL-CCNC: 104 IU/L (ref 15–37)
BASOPHILS ABSOLUTE: 0.1 K/CU MM
BASOPHILS RELATIVE PERCENT: 0.9 % (ref 0–1)
BETA GLOBULIN: 1 GM/DL (ref 0.5–1.3)
BILIRUB SERPL-MCNC: 12.8 MG/DL (ref 0–1)
BILIRUBIN DIRECT: 9.9 MG/DL (ref 0–0.3)
BILIRUBIN, INDIRECT: 2.9 MG/DL (ref 0–0.7)
BUN BLDV-MCNC: 73 MG/DL (ref 6–23)
CALCIUM SERPL-MCNC: 8.6 MG/DL (ref 8.3–10.6)
CHLORIDE BLD-SCNC: 95 MMOL/L (ref 99–110)
CO2: 23 MMOL/L (ref 21–32)
CREAT SERPL-MCNC: 4.8 MG/DL (ref 0.6–1.1)
DIFFERENTIAL TYPE: ABNORMAL
EOSINOPHILS ABSOLUTE: 0.1 K/CU MM
EOSINOPHILS RELATIVE PERCENT: 1.5 % (ref 0–3)
GAMMA GLOBULIN: 1.1 GM/DL (ref 0.5–1.6)
GFR AFRICAN AMERICAN: 11 ML/MIN/1.73M2
GFR NON-AFRICAN AMERICAN: 9 ML/MIN/1.73M2
GLUCOSE BLD-MCNC: 100 MG/DL (ref 70–99)
GLUCOSE BLD-MCNC: 107 MG/DL (ref 70–99)
GLUCOSE BLD-MCNC: 124 MG/DL (ref 70–99)
GLUCOSE BLD-MCNC: 85 MG/DL (ref 70–99)
GLUCOSE BLD-MCNC: 91 MG/DL (ref 70–99)
HCT VFR BLD CALC: 25.2 % (ref 37–47)
HEMOGLOBIN: 9.2 GM/DL (ref 12.5–16)
IMMATURE NEUTROPHIL %: 0.9 % (ref 0–0.43)
LYMPHOCYTES ABSOLUTE: 1.4 K/CU MM
LYMPHOCYTES RELATIVE PERCENT: 20.7 % (ref 24–44)
MCH RBC QN AUTO: 30.9 PG (ref 27–31)
MCHC RBC AUTO-ENTMCNC: 36.5 % (ref 32–36)
MCV RBC AUTO: 84.6 FL (ref 78–100)
MONOCYTES ABSOLUTE: 0.8 K/CU MM
MONOCYTES RELATIVE PERCENT: 12 % (ref 0–4)
NUCLEATED RBC %: 0 %
PDW BLD-RTO: 15.7 % (ref 11.7–14.9)
PHOSPHORUS: 5.5 MG/DL (ref 2.5–4.9)
PLATELET # BLD: 248 K/CU MM (ref 140–440)
PMV BLD AUTO: 12.8 FL (ref 7.5–11.1)
POTASSIUM SERPL-SCNC: 4.7 MMOL/L (ref 3.5–5.1)
RBC # BLD: 2.98 M/CU MM (ref 4.2–5.4)
SEGMENTED NEUTROPHILS ABSOLUTE COUNT: 4.3 K/CU MM
SEGMENTED NEUTROPHILS RELATIVE PERCENT: 64 % (ref 36–66)
SODIUM BLD-SCNC: 131 MMOL/L (ref 135–145)
SPEP INTERPRETATION: ABNORMAL
TOTAL IMMATURE NEUTOROPHIL: 0.06 K/CU MM
TOTAL NUCLEATED RBC: 0 K/CU MM
TOTAL PROTEIN: 5.8 GM/DL (ref 6.4–8.2)
TOTAL PROTEIN: 5.8 GM/DL (ref 6.4–8.2)
WBC # BLD: 6.7 K/CU MM (ref 4–10.5)

## 2020-06-15 PROCEDURE — 80076 HEPATIC FUNCTION PANEL: CPT

## 2020-06-15 PROCEDURE — 6370000000 HC RX 637 (ALT 250 FOR IP): Performed by: UROLOGY

## 2020-06-15 PROCEDURE — 1200000000 HC SEMI PRIVATE

## 2020-06-15 PROCEDURE — 80069 RENAL FUNCTION PANEL: CPT

## 2020-06-15 PROCEDURE — 2580000003 HC RX 258: Performed by: UROLOGY

## 2020-06-15 PROCEDURE — 2580000003 HC RX 258: Performed by: INTERNAL MEDICINE

## 2020-06-15 PROCEDURE — 6360000002 HC RX W HCPCS: Performed by: UROLOGY

## 2020-06-15 PROCEDURE — 36415 COLL VENOUS BLD VENIPUNCTURE: CPT

## 2020-06-15 PROCEDURE — 94761 N-INVAS EAR/PLS OXIMETRY MLT: CPT

## 2020-06-15 PROCEDURE — 85025 COMPLETE CBC W/AUTO DIFF WBC: CPT

## 2020-06-15 PROCEDURE — 82962 GLUCOSE BLOOD TEST: CPT

## 2020-06-15 RX ORDER — SODIUM CHLORIDE 9 MG/ML
INJECTION, SOLUTION INTRAVENOUS CONTINUOUS
Status: DISCONTINUED | OUTPATIENT
Start: 2020-06-15 | End: 2020-06-16 | Stop reason: HOSPADM

## 2020-06-15 RX ADMIN — ONDANSETRON 4 MG: 2 INJECTION INTRAMUSCULAR; INTRAVENOUS at 22:28

## 2020-06-15 RX ADMIN — PANTOPRAZOLE SODIUM 40 MG: 40 TABLET, DELAYED RELEASE ORAL at 05:25

## 2020-06-15 RX ADMIN — GABAPENTIN 100 MG: 100 CAPSULE ORAL at 21:00

## 2020-06-15 RX ADMIN — SODIUM CHLORIDE, PRESERVATIVE FREE 10 ML: 5 INJECTION INTRAVENOUS at 08:19

## 2020-06-15 RX ADMIN — SODIUM CHLORIDE: 9 INJECTION, SOLUTION INTRAVENOUS at 08:19

## 2020-06-15 RX ADMIN — SODIUM CHLORIDE: 9 INJECTION, SOLUTION INTRAVENOUS at 21:00

## 2020-06-15 ASSESSMENT — PAIN SCALES - GENERAL
PAINLEVEL_OUTOF10: 0

## 2020-06-15 NOTE — PROGRESS NOTES
[facility] Hospital[facility], [address]   Progress Note  [facility] 0 1 2      Date: 6/15/2020   Patient: Halie Wells   : 1950   DOA: 2020   MRN: [id number]   ROOM#: [id number]     Admit Date: 2020     Collaborating Urologist on Call at time of admission: Dr. Sayda Doyle  CC: Right flank pain  POD #2: cystoscopy, bilateral RGPG, bilateral ureteral stent placement  Subjective:     Pain: mild, no nausea and no vomiting,   Bowel Movement/Flatus:   Yes  Voiding: Lujan catheter in place draining dark yellow urine with moderate amount of sediment noted in tubing    Pt resting in bedside recliner and eating breakfast. States she feels better and her right flank pain is gone.     Objective:    Vitals:    BP 91/60   Pulse 78   Temp 97.5 °F (36.4 °C) (Oral)   Resp 16   Ht 5' 2.01\" (1.575 m)   Wt 240 lb 9.6 oz (109.1 kg)   LMP  (LMP Unknown)   SpO2 92%   BMI 43.99 kg/m²    Temp  Av.6 °F (36.4 °C)  Min: 97.5 °F (36.4 °C)  Max: 97.7 °F (36.5 °C)       Intake/Output Summary (Last 24 hours) at 6/15/2020 0743  Last data filed at 6/15/2020 0530  Gross per 24 hour   Intake 1200 ml   Output 2500 ml   Net -1300 ml       Physical Exam:   General appearance: alert, appears stated age, cooperative and no distress  Head: Normocephalic, without obvious abnormality, atraumatic  Back: Mild left CVA tenderness  Abdomen: Soft, non-tender, non-distended   : Lujan catheter in place draining dark yellow urine with moderate amount of sediment noted in tubing    Labs:   WBC:    Lab Results   Component Value Date    WBC 6.7 06/15/2020      Hemoglobin/Hematocrit:    Lab Results   Component Value Date    HGB 9.2 06/15/2020    HCT 25.2 06/15/2020      BMP:   Lab Results   Component Value Date     06/15/2020    K 4.7 06/15/2020    CL 95 06/15/2020    CO2 23 06/15/2020    BUN 73 06/15/2020    LABALBU 3.0 06/15/2020    CREATININE 4.8 06/15/2020    CALCIUM 8.6 06/15/2020    GFRAA 11 06/15/2020    GFRAA >60 04/10/2013 renal lesion with layering T1 hyperintense material compatible with a complex cyst. There is a similar appearing lesion just laterally measuring up to 0.8 cm. No enhancing renal mass. There is a 1.0 cm simple cyst in the mid to lower region of the right kidney. Other: The spleen is within normal limits. The right adrenal gland is within normal limits. There is diffuse thickening of the left adrenal gland with a more focal 2.2 x 1.8 cm nodule. There are a few foci of signal loss on the T1 out of phase images within the nodule itself but overall similar in signal characteristics on the T1 in phase. The aorta is normal in caliber. Celiac axis, SMA and SRI are patent. There is a large hiatal hernia. Visualized bowel is otherwise unremarkable. There is subtle nodularity into the left upper quadrant omentum (series 17, image 32), new since the prior exam.  No obvious adenopathy is noted. Lung bases are grossly clear. No acute osseous abnormality. Small volume ascites. 1. 3.7 x 2.6 cm ill-defined irregular hypoenhancing lesion centered in the pancreatic uncinate process with extension into the pancreatic head suspicious for neoplasm until proven otherwise. There is soft tissue extension into the retroperitoneum and marsha hepatis resulting in common bile duct obstruction as well as bilateral hydronephrosis. 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 However given constellation of findings neoplasm needs to be excluded. Recommend further evaluation with EUS and ERCP as well as urologic consult for possible stenting. 2. Distended gallbladder with gallbladder sludge. 3. Mild hepatic steatosis. 4. Bosniak type 1 and type 2 right renal cysts. 5. Indeterminate 2.2 x 1.8 cm left adrenal nodule.   There are a few foci of signal loss on T1 out of phase images, however, this has overall increased in size since 2015 raising suspicion for metastatic disease. 6. Small volume ascites. 7. Subtle nodularity is noted in left upper quadrant omentum some of which could be related to small collateral vasculature. Attention on follow-up exams as early carcinomatosis cannot be excluded. Results were called by Dr. Maria Ines Fajardo MD to Mel Ganser on 6/5/2020 at 12:37. Us Abdomen Limited    Result Date: 6/1/2020  EXAMINATION: RIGHT UPPER QUADRANT ULTRASOUND 6/1/2020 12:52 pm COMPARISON: CT abdomen and pelvis 05/12/2020. HISTORY: ORDERING SYSTEM PROVIDED HISTORY: Elevated bilirubin FINDINGS: LIVER:  Suboptimally visualized. Hepatic echogenicity appears within normal limits. There is mild intrahepatic biliary ductal dilatation. BILIARY SYSTEM:  Sludge is seen in the gallbladder without well-defined shadowing echogenic stones. No gallbladder wall thickening. Sonographic Renetta River sign is negative. Common bile duct is dilated to 12 mm. RIGHT KIDNEY: Mild dilatation of the right renal collecting system. PANCREAS:  Not visualized. OTHER: Small volume right upper quadrant ascites. Evaluation is limited by poor acoustic windows. The common bile duct is dilated to 12 mm with mild intrahepatic biliary ductal dilatation. This can be further assessed with an MRCP. Gallbladder sludge without sonographic evidence of acute cholecystitis. Mild dilatation of the right renal collecting system. Small volume right upper quadrant ascites. Vl Dup Lower Extremity Venous Right    Result Date: 6/14/2020  EXAMINATION: DUPLEX VENOUS ULTRASOUND OF THE RIGHT LOWER EXTREMITY, 6/14/2020 9:46 am TECHNIQUE: Duplex ultrasound and Doppler images were obtained of the right lower extremity. COMPARISON: None.  HISTORY: ORDERING SYSTEM PROVIDED HISTORY: LE edema for 2 weeks, assess for DVT TECHNOLOGIST PROVIDED HISTORY: Reason for exam:->LE edema for 2 weeks, assess for DVT Acuity: Acute Type of Exam: Initial FINDINGS: The visualized veins of the

## 2020-06-15 NOTE — CARE COORDINATION
Reviewed chart and spoke with pt about discharge needs/plans. Pt lives with her , she has a cane and RW at home. Pt has a PCP and insurance that covers medications. Attempted to discuss discharge plan but pt believes she will be discharged directly to [facility]. Pt will continue to follow for needs.

## 2020-06-15 NOTE — PROGRESS NOTES
1/2ns and give NS  - if d/c'ed today then will see her as outpt  - stop losartan and metformin upon dc    Thank you                      Electronically signed by Meera Reid DO on 6/15/2020 at 7:06 AM    MD Radha Thao DO [address]  [place], [place] [address]  PHONE: [phone]  FAX: [phone]

## 2020-06-16 ENCOUNTER — TELEPHONE (OUTPATIENT)
Dept: INTERNAL MEDICINE CLINIC | Age: 70
End: 2020-06-16

## 2020-06-16 VITALS
WEIGHT: 250 LBS | TEMPERATURE: 98.2 F | HEIGHT: 62 IN | OXYGEN SATURATION: 96 % | HEART RATE: 91 BPM | DIASTOLIC BLOOD PRESSURE: 72 MMHG | SYSTOLIC BLOOD PRESSURE: 99 MMHG | RESPIRATION RATE: 18 BRPM | BODY MASS INDEX: 46.01 KG/M2

## 2020-06-16 LAB
ALBUMIN SERPL-MCNC: 3.1 GM/DL (ref 3.4–5)
ALBUMIN SERPL-MCNC: 3.2 GM/DL (ref 3.4–5)
ALP BLD-CCNC: 1075 IU/L (ref 40–129)
ALT SERPL-CCNC: 128 U/L (ref 10–40)
AMYLASE: 86 U/L (ref 25–115)
ANION GAP SERPL CALCULATED.3IONS-SCNC: 13 MMOL/L (ref 4–16)
AST SERPL-CCNC: 166 IU/L (ref 15–37)
BASOPHILS ABSOLUTE: 0.1 K/CU MM
BASOPHILS RELATIVE PERCENT: 1.1 % (ref 0–1)
BILIRUB SERPL-MCNC: 16.2 MG/DL (ref 0–1)
BILIRUBIN DIRECT: 12.5 MG/DL (ref 0–0.3)
BILIRUBIN, INDIRECT: 3.7 MG/DL (ref 0–0.7)
BUN BLDV-MCNC: 61 MG/DL (ref 6–23)
CALCIUM SERPL-MCNC: 8.9 MG/DL (ref 8.3–10.6)
CHLORIDE BLD-SCNC: 96 MMOL/L (ref 99–110)
CO2: 23 MMOL/L (ref 21–32)
CREAT SERPL-MCNC: 2.7 MG/DL (ref 0.6–1.1)
DIFFERENTIAL TYPE: ABNORMAL
EOSINOPHILS ABSOLUTE: 0.2 K/CU MM
EOSINOPHILS RELATIVE PERCENT: 3.5 % (ref 0–3)
GFR AFRICAN AMERICAN: 21 ML/MIN/1.73M2
GFR NON-AFRICAN AMERICAN: 17 ML/MIN/1.73M2
GLUCOSE BLD-MCNC: 98 MG/DL (ref 70–99)
HCT VFR BLD CALC: 29.8 % (ref 37–47)
HEMOGLOBIN: 10.2 GM/DL (ref 12.5–16)
IMMATURE NEUTROPHIL %: 1.4 % (ref 0–0.43)
LIPASE: 165 IU/L (ref 13–60)
LYMPHOCYTES ABSOLUTE: 1 K/CU MM
LYMPHOCYTES RELATIVE PERCENT: 16 % (ref 24–44)
MCH RBC QN AUTO: 31.1 PG (ref 27–31)
MCHC RBC AUTO-ENTMCNC: 34.2 % (ref 32–36)
MCV RBC AUTO: 90.9 FL (ref 78–100)
MONOCYTES ABSOLUTE: 0.8 K/CU MM
MONOCYTES RELATIVE PERCENT: 12.1 % (ref 0–4)
NEUTROPHIL CYTOPLASMIC AB IGG: NORMAL
NUCLEATED RBC %: 0 %
PDW BLD-RTO: 17.8 % (ref 11.7–14.9)
PHOSPHORUS: 4 MG/DL (ref 2.5–4.9)
PLATELET # BLD: 231 K/CU MM (ref 140–440)
PMV BLD AUTO: 12.8 FL (ref 7.5–11.1)
POTASSIUM SERPL-SCNC: 4.4 MMOL/L (ref 3.5–5.1)
RBC # BLD: 3.28 M/CU MM (ref 4.2–5.4)
SEGMENTED NEUTROPHILS ABSOLUTE COUNT: 4.2 K/CU MM
SEGMENTED NEUTROPHILS RELATIVE PERCENT: 65.9 % (ref 36–66)
SODIUM BLD-SCNC: 132 MMOL/L (ref 135–145)
TOTAL IMMATURE NEUTOROPHIL: 0.09 K/CU MM
TOTAL NUCLEATED RBC: 0 K/CU MM
TOTAL PROTEIN: 6.1 GM/DL (ref 6.4–8.2)
WBC # BLD: 6.4 K/CU MM (ref 4–10.5)

## 2020-06-16 PROCEDURE — 80053 COMPREHEN METABOLIC PANEL: CPT

## 2020-06-16 PROCEDURE — 85025 COMPLETE CBC W/AUTO DIFF WBC: CPT

## 2020-06-16 PROCEDURE — 83690 ASSAY OF LIPASE: CPT

## 2020-06-16 PROCEDURE — 6370000000 HC RX 637 (ALT 250 FOR IP): Performed by: FAMILY MEDICINE

## 2020-06-16 PROCEDURE — 80076 HEPATIC FUNCTION PANEL: CPT

## 2020-06-16 PROCEDURE — 82150 ASSAY OF AMYLASE: CPT

## 2020-06-16 PROCEDURE — 80069 RENAL FUNCTION PANEL: CPT

## 2020-06-16 PROCEDURE — 6370000000 HC RX 637 (ALT 250 FOR IP): Performed by: UROLOGY

## 2020-06-16 PROCEDURE — 6360000002 HC RX W HCPCS: Performed by: UROLOGY

## 2020-06-16 PROCEDURE — 82962 GLUCOSE BLOOD TEST: CPT

## 2020-06-16 PROCEDURE — 2580000003 HC RX 258: Performed by: UROLOGY

## 2020-06-16 RX ORDER — ONDANSETRON 4 MG/1
4 TABLET, FILM COATED ORAL 3 TIMES DAILY PRN
Qty: 30 TABLET | Refills: 0 | Status: SHIPPED | OUTPATIENT
Start: 2020-06-16 | End: 2020-06-17

## 2020-06-16 RX ORDER — PROMETHAZINE HYDROCHLORIDE 12.5 MG/1
12.5 TABLET ORAL EVERY 6 HOURS PRN
Qty: 30 TABLET | Refills: 0 | Status: SHIPPED | OUTPATIENT
Start: 2020-06-16

## 2020-06-16 RX ADMIN — TRAMADOL HYDROCHLORIDE 100 MG: 50 TABLET, FILM COATED ORAL at 08:22

## 2020-06-16 RX ADMIN — PANTOPRAZOLE SODIUM 40 MG: 40 TABLET, DELAYED RELEASE ORAL at 08:22

## 2020-06-16 RX ADMIN — ONDANSETRON 4 MG: 2 INJECTION INTRAMUSCULAR; INTRAVENOUS at 08:22

## 2020-06-16 RX ADMIN — SODIUM CHLORIDE, PRESERVATIVE FREE 10 ML: 5 INJECTION INTRAVENOUS at 08:24

## 2020-06-16 ASSESSMENT — PAIN DESCRIPTION - PROGRESSION: CLINICAL_PROGRESSION: NOT CHANGED

## 2020-06-16 ASSESSMENT — PAIN SCALES - GENERAL
PAINLEVEL_OUTOF10: 6
PAINLEVEL_OUTOF10: 0
PAINLEVEL_OUTOF10: 6

## 2020-06-16 ASSESSMENT — PAIN DESCRIPTION - PAIN TYPE: TYPE: CHRONIC PAIN

## 2020-06-16 ASSESSMENT — PAIN DESCRIPTION - FREQUENCY: FREQUENCY: CONTINUOUS

## 2020-06-16 ASSESSMENT — PAIN DESCRIPTION - LOCATION: LOCATION: HIP

## 2020-06-16 ASSESSMENT — PAIN DESCRIPTION - ONSET: ONSET: ON-GOING

## 2020-06-16 ASSESSMENT — PAIN DESCRIPTION - ORIENTATION: ORIENTATION: RIGHT

## 2020-06-16 ASSESSMENT — PAIN DESCRIPTION - DESCRIPTORS: DESCRIPTORS: ACHING

## 2020-06-16 NOTE — DISCHARGE SUMMARY
jaundiced and GI consulted and has appt with her oncologist at [address] and will need EUS with ERCP biopsy of pancreas and updated her oncologist. She was discharged but will need follow up with [facility] within the week for her pancreatic biopsy. Consults: GI, nephrology and urology        Outstanding Order Results     Date and Time Order Name Status Description    6/16/2020 0125 Hepatic Function Panel In process     6/13/2020 1952 Anti-Neutrophilic Cytoplasmic Antibody In process               Discharge Exam:  HEENT: Normal HEENT exam.    Lungs:  Normal effort. Heart: Normal rate. Abdomen: Abdomen is soft. Bowel sounds are normal.     Extremities: Decreased range of motion. Neurological: Patient is alert. Pupils:  Pupils are equal, round, and reactive to light. Skin:  Warm. (Jaundiced)    Disposition: home    Patient Instructions:      Medication List      START taking these medications    promethazine 12.5 MG tablet  Commonly known as:  PHENERGAN  Take 1 tablet by mouth every 6 hours as needed for Nausea (if zofran is ineffective)        CONTINUE taking these medications    * albuterol 1.25 MG/3ML nebulizer solution  Commonly known as:  AccuNeb  Inhale 3 mLs into the lungs every 6 hours as needed for Wheezing     * albuterol sulfate  (90 Base) MCG/ACT inhaler  Inhale 2 puffs into the lungs every 6 hours as needed for Wheezing or Shortness of Breath     * anastrozole 1 MG tablet  Commonly known as:  Arimidex  Take 1 tablet by mouth daily     * anastrozole 1 MG tablet  Commonly known as:  ARIMIDEX  TAKE 1 TABLET BY MOUTH EVERY DAY     exemestane 25 MG tablet  Commonly known as:  AROMASIN     Flovent  MCG/ACT inhaler  Generic drug:  fluticasone  TAKE 2 PUFFS BY MOUTH TWICE A DAY     gabapentin 100 MG capsule  Commonly known as:  NEURONTIN  Take 1 capsule by mouth nightly for 360 days.      magnesium 30 MG tablet     mometasone 50 MCG/ACT nasal spray  Commonly known as: NASONEX  2 sprays by Nasal route daily     omeprazole 20 MG delayed release capsule  Commonly known as:  PRILOSEC  Take 1 capsule by mouth daily     ondansetron 4 MG tablet  Commonly known as:  ZOFRAN  Take 1 tablet by mouth 3 times daily as needed for Nausea or Vomiting     UNABLE TO FIND  Please administer two SHINGRIX vaccines 2-6 months apart     vitamin E 1000 units capsule         * This list has 4 medication(s) that are the same as other medications prescribed for you. Read the directions carefully, and ask your doctor or other care provider to review them with you.             STOP taking these medications    aspirin 81 MG chewable tablet  Commonly known as:  Aspirin Childrens     atorvastatin 40 MG tablet  Commonly known as:  LIPITOR     CeleBREX 100 MG capsule  Generic drug:  celecoxib     furosemide 20 MG tablet  Commonly known as:  Lasix     hydroCHLOROthiazide 25 MG tablet  Commonly known as:  HYDRODIURIL     Klor-Con M20 20 MEQ extended release tablet  Generic drug:  potassium chloride     losartan 50 MG tablet  Commonly known as:  COZAAR     metFORMIN 500 MG tablet  Commonly known as:  GLUCOPHAGE     potassium chloride 20 MEQ extended release tablet  Commonly known as:  Klor-Con M20     tiZANidine 2 MG tablet  Commonly known as:  ZANAFLEX     vitamin D3 25 MCG (1000 UT) Tabs tablet  Commonly known as:  CHOLECALCIFEROL           Where to Get Your Medications      You can get these medications from any pharmacy    Bring a paper prescription for each of these medications  · ondansetron 4 MG tablet  · promethazine 12.5 MG tablet         Activity: activity as tolerated  Diet: renal diet  Wound Care: none needed    Follow-up with PCP  Discharge time 35min  Signed:  Mike Salinas  6/16/2020  2:32 PM

## 2020-06-16 NOTE — PROGRESS NOTES
LABGLOM 17 06/16/2020    LABGLOM 42 05/28/2020      PT/INR:    Lab Results   Component Value Date    PROTIME 16.7 06/14/2020    INR 1.38 06/14/2020      PTT:    Lab Results   Component Value Date    APTT 34.0 06/14/2020     Imaging:  Ct Abdomen Pelvis Wo Contrast Additional Contrast? None    Result Date: 6/13/2020  EXAMINATION: CT OF THE ABDOMEN AND PELVIS WITHOUT CONTRAST 6/12/2020 10:32 pm TECHNIQUE: CT of the abdomen and pelvis was performed without the administration of intravenous contrast. Multiplanar reformatted images are provided for review. Dose modulation, iterative reconstruction, and/or weight based adjustment of the mA/kV was utilized to reduce the radiation dose to as low as reasonably achievable. COMPARISON: CT abdomen and pelvis 05/12/2020; abdominal MRI 06/05/2020; abdominal ultrasound 06/01/2020 and 06/02/2020 HISTORY: ORDERING SYSTEM PROVIDED HISTORY: rapidly growing tumor, having increased urinary symptoms, was concerned was tracking retroperitoneal and causing hydro last week, new from just a couple of weeks ago and now increasing urinary symptoms Relevant Medical/Surgical History: Flank Pain (R sided, told to come in by Dr Contreras Ramirez) FINDINGS: LOWER CHEST Lung bases: Unchanged small left pleural effusion with basilar atelectasis. Moderate hiatal hernia with posterior mediastinal fluid is also similar. Included heart demonstrates coronary calcifications along the LAD territory. ABDOMEN Liver: Normal liver size, contour and parenchymal attenuation. No focal lesion on this noncontrast evaluation. Gallbladder: Distended gallbladder. Previously seen sludge/stones on comparison MRI are inconspicuous. Biliary: No intra or extrahepatic bile duct dilatation. Pancreas: Previously seen ill-defined soft tissue at the pancreatic uncinate process and pancreatic head is relatively inconspicuous on this noncontrast exam. Spleen: Normal. Adrenals: Stable indeterminate left adrenal nodule.   Normal right adrenal gland. Kidneys: Kidneys are symmetric in size. Known renal cysts are better seen on comparison MRI. Unchanged hydronephrosis. GI Tract: Unchanged hiatal hernia. No apparent bowel wall thickening or obstruction. Colonic diverticulosis. Peritoneum/Retroperitoneum: Small volume abdominal ascites with mesenteric edema. No free air. No appreciable lymphadenopathy. Vascular: Normal caliber abdominal aorta and IVC. PELVIS Genitourinary: Normal urinary bladder. Unremarkable uterus. Ovaries are not well delineated amidst fluid in bowel. Other: Pelvic ascites. No enlarged lymph nodes. MUSCULOSKELETAL Bones and Soft Tissues: No acute superficial soft tissue or osseous abnormality. No suspicious osteolytic or osteoblastic lesion. Advanced thoracolumbar spondylosis with facet arthrosis and grade 1 retrolisthesis of L2 on L3 and L3 on L4. No substantial change in mild-to-moderate bilateral hydronephrosis. Previously described pancreatic head/uncinate process infiltrative mass is not well delineated on this noncontrast exam.  No pancreatic or bile duct dilatation. Distended gallbladder, presumably sludge filled as seen on prior MRI. Small volume abdominal ascites, similar to prior. Colonic diverticulosis without evidence of diverticulitis. Moderate paraesophageal hiatal hernia with adjacent fluid, unchanged. Stable indeterminate left adrenal gland nodule most likely representing a benign adenoma however follow-up adrenal washout CT could confirm in 12 months. Small volume left pleural effusion is unchanged. Fl Less Than 1 Hour    Result Date: 6/14/2020  EXAMINATION: SPOT FLUOROSCOPIC IMAGES 6/13/2020 3:49 pm TECHNIQUE: Fluoroscopy was provided by the radiology department for procedure. Radiologist was not present during examination.  FLUOROSCOPY DOSE AND TYPE OR TIME AND EXPOSURES: 7 seconds, 5 images COMPARISON: None HISTORY: ORDERING SYSTEM PROVIDED HISTORY: josefa cysto TECHNOLOGIST PROVIDED HISTORY: Reason for 2015 raising suspicion for metastatic disease. 6. Small volume ascites. 7. Subtle nodularity is noted in left upper quadrant omentum some of which could be related to small collateral vasculature. Attention on follow-up exams as early carcinomatosis cannot be excluded. Results were called by Dr. Elliott Liz. Mandi Damico MD to [facility] on 6/5/2020 at 12:37. Us Abdomen Limited    Result Date: 6/1/2020  EXAMINATION: RIGHT UPPER QUADRANT ULTRASOUND 6/1/2020 12:52 pm COMPARISON: CT abdomen and pelvis 05/12/2020. HISTORY: ORDERING SYSTEM PROVIDED HISTORY: Elevated bilirubin FINDINGS: LIVER:  Suboptimally visualized. Hepatic echogenicity appears within normal limits. There is mild intrahepatic biliary ductal dilatation. BILIARY SYSTEM:  Sludge is seen in the gallbladder without well-defined shadowing echogenic stones. No gallbladder wall thickening. Sonographic Barb Michelle sign is negative. Common bile duct is dilated to 12 mm. RIGHT KIDNEY: Mild dilatation of the right renal collecting system. PANCREAS:  Not visualized. OTHER: Small volume right upper quadrant ascites. Evaluation is limited by poor acoustic windows. The common bile duct is dilated to 12 mm with mild intrahepatic biliary ductal dilatation. This can be further assessed with an MRCP. Gallbladder sludge without sonographic evidence of acute cholecystitis. Mild dilatation of the right renal collecting system. Small volume right upper quadrant ascites. Vl Dup Lower Extremity Venous Right    Result Date: 6/14/2020  EXAMINATION: DUPLEX VENOUS ULTRASOUND OF THE RIGHT LOWER EXTREMITY, 6/14/2020 9:46 am TECHNIQUE: Duplex ultrasound and Doppler images were obtained of the right lower extremity. COMPARISON: None.  HISTORY: ORDERING SYSTEM PROVIDED HISTORY: LE edema for 2 weeks, assess for DVT TECHNOLOGIST PROVIDED HISTORY: Reason for exam:->LE edema for 2 weeks, assess for DVT Acuity: Acute Type of Exam: Initial FINDINGS: The visualized veins of the

## 2020-06-17 ENCOUNTER — VIRTUAL VISIT (OUTPATIENT)
Dept: INTERNAL MEDICINE CLINIC | Age: 70
End: 2020-06-17
Payer: MEDICARE

## 2020-06-17 PROCEDURE — 99496 TRANSJ CARE MGMT HIGH F2F 7D: CPT | Performed by: INTERNAL MEDICINE

## 2020-06-17 PROCEDURE — 1111F DSCHRG MED/CURRENT MED MERGE: CPT | Performed by: INTERNAL MEDICINE

## 2020-06-17 RX ORDER — ONDANSETRON HYDROCHLORIDE 8 MG/1
8 TABLET, FILM COATED ORAL EVERY 8 HOURS PRN
Qty: 90 TABLET | Refills: 3 | Status: SHIPPED | OUTPATIENT
Start: 2020-06-17

## 2020-06-17 RX ORDER — HYDROCODONE BITARTRATE AND ACETAMINOPHEN 7.5; 325 MG/1; MG/1
1 TABLET ORAL EVERY 6 HOURS PRN
Qty: 120 TABLET | Refills: 0 | Status: SHIPPED | OUTPATIENT
Start: 2020-06-17 | End: 2020-07-17

## 2023-09-19 NOTE — TELEPHONE ENCOUNTER
[facility] Transitions Initial Follow Up Call    Outreach made within 2 business days of discharge: Yes    Patient: Yumiko De León Patient : 1950   MRN: [id number]  Reason for Admission: There are no discharge diagnoses documented for the most recent discharge. Discharge Date: 20       Spoke with: Patient    Discharge department/facility: [facility]    TCM Interactive Patient Contact:  Was patient able to fill all prescriptions: Yes  Was patient instructed to bring all medications to the follow-up visit: No  Is patient taking all medications as directed in the discharge summary?  no  Does patient understand their discharge instructions: yes  Does patient have questions or concerns that need addressed prior to 7-14 day follow up office visit: no  Scheduled appointment with PCP within 7-14 days    Follow Up  Future Appointments   Date Time Provider Clint Freeman   2020  3:00 PM Perry Prince MD [facility] TOAN CRUM   2020  8:45 AM Perry Prince MD [facility] E CHARLI Slater MA
Adequate: hears normal conversation without difficulty

## (undated) DEVICE — TRAY PREP DRY W/ PREM GLV 2 APPL 6 SPNG 2 UNDPD 1 OVERWRAP

## (undated) DEVICE — TOWEL,OR,DSP,ST,BLUE,STD,6/PK,12PK/CS: Brand: MEDLINE

## (undated) DEVICE — BW-412T DISP COMBO CLEANING BRUSH: Brand: SINGLE USE COMBINATION CLEANING BRUSH

## (undated) DEVICE — Z DISCONTINUED (USE MFG CAT MVABO)  TUBING GAS SAMPLING STD 6.5 FT FEMALE CONN SMRT CAPNOLINE

## (undated) DEVICE — KENDALL 500 SERIES DIAPHORETIC FOAM MONITORING ELECTRODE - TEAR DROP SHAPE ( 30/PK): Brand: KENDALL

## (undated) DEVICE — JELLY,LUBE,STERILE,FLIP TOP,TUBE,2-OZ: Brand: MEDLINE

## (undated) DEVICE — THE TORRENT IRRIGATION SCOPE CONNECTOR IS USED WITH THE TORRENT IRRIGATION TUBING TO PROVIDE IRRIGATION FLUIDS SUCH AS STERILE WATER DURING GASTROINTESTINAL ENDOSCOPIC PROCEDURES WHEN USED IN CONJUNCTION WITH AN IRRIGATION PUMP (OR ELECTROSURGICAL UNIT).: Brand: TORRENT

## (undated) DEVICE — JELLY LUBRICATING 3 GM BACTERIOSTATIC

## (undated) DEVICE — MAT FLOOR ULTRA ABS 28X48IN

## (undated) DEVICE — BAG DRNGE W 8MM ADPT AND SUCT HOSE CYSTO UROLOGICAL FOR

## (undated) DEVICE — CATHETER URET 5FR L70CM TIP 8FR OPN END CONE TIP INJ HUB

## (undated) DEVICE — FORCEPS BX L240CM JAW DIA2.8MM L CAP W/ NDL MIC MESH TOOTH

## (undated) DEVICE — GUIDEWIRE ENDOSCP L150CM DIA0.035IN TIP 3CM PTFE NIT

## (undated) DEVICE — TUBING, SUCTION, 9/32" X 10', STRAIGHT: Brand: MEDLINE

## (undated) DEVICE — BRUSH CLN DIA5MM NYL SGL END CBL ASST FLEX DSTL TIP POLYPR

## (undated) DEVICE — Z INACTIVE USE 2635503 SOLUTION IRRIG 3000ML ST H2O USP UROMATIC PLAS CONT

## (undated) DEVICE — ELECTRODE ES AD CRDLSS PT RET REM POLYHESIVE

## (undated) DEVICE — LINER SUCT CANSTR 1500CC SEMI RIG W/ POR HYDROPHOBIC SHUT

## (undated) DEVICE — TUBING, SUCTION, 3/16" X 6', STRAIGHT: Brand: MEDLINE

## (undated) DEVICE — STERILE POLYISOPRENE POWDER-FREE SURGICAL GLOVES: Brand: PROTEXIS

## (undated) DEVICE — SINGLE PORT MANIFOLD: Brand: NEPTUNE 2

## (undated) DEVICE — MARKER SURG SKIN UTIL REGULAR/FINE 2 TIP W/ RUL AND 9 LBL

## (undated) DEVICE — SET IRRIG L94IN ID0.281IN W/ 4.5IN DST FLX CONN 2 LD ON OFF

## (undated) DEVICE — YANKAUER,FLEXIBLE HANDLE,REGLR CAPACITY: Brand: MEDLINE INDUSTRIES, INC.